# Patient Record
Sex: FEMALE | Race: WHITE | Employment: FULL TIME | URBAN - METROPOLITAN AREA
[De-identification: names, ages, dates, MRNs, and addresses within clinical notes are randomized per-mention and may not be internally consistent; named-entity substitution may affect disease eponyms.]

---

## 2023-08-17 ENCOUNTER — HOSPITAL ENCOUNTER (INPATIENT)
Age: 48
LOS: 1 days | Discharge: HOME | End: 2023-08-18
Attending: EMERGENCY MEDICINE | Admitting: SURGERY
Payer: COMMERCIAL

## 2023-08-17 DIAGNOSIS — T30.0 BURN: Primary | ICD-10-CM

## 2023-08-17 DIAGNOSIS — T23.061A: ICD-10-CM

## 2023-08-17 LAB
ABO + RH BLD: NORMAL
ANION GAP SERPL CALCULATED.3IONS-SCNC: 10 MMOL/L (ref 9–17)
ARM BAND NUMBER: NORMAL
BLOOD BANK SAMPLE EXPIRATION: NORMAL
BLOOD BANK SPECIMEN: ABNORMAL
BLOOD GROUP ANTIBODIES SERPL: NEGATIVE
BODY TEMPERATURE: 37
BUN SERPL-MCNC: 11 MG/DL (ref 6–20)
CHLORIDE SERPL-SCNC: 103 MMOL/L (ref 98–107)
CO2 SERPL-SCNC: 23 MMOL/L (ref 20–31)
COHGB MFR BLD: 2.5 % (ref 0–5)
CREAT SERPL-MCNC: 0.8 MG/DL (ref 0.5–0.9)
ERYTHROCYTE [DISTWIDTH] IN BLOOD BY AUTOMATED COUNT: 12.2 % (ref 11.8–14.4)
ETHANOL PERCENT: <0.01 %
ETHANOLAMINE SERPL-MCNC: <10 MG/DL
FIO2 ON VENT: ABNORMAL %
GFR SERPL CREATININE-BSD FRML MDRD: >60 ML/MIN/1.73M2
GLUCOSE SERPL-MCNC: 85 MG/DL (ref 70–99)
HCG SERPL QL: NEGATIVE
HCO3 VENOUS: 24.9 MMOL/L (ref 24–30)
HCT VFR BLD AUTO: 34.2 % (ref 36.3–47.1)
HGB BLD-MCNC: 11.3 G/DL (ref 11.9–15.1)
INR PPP: 1.1
MCH RBC QN AUTO: 31.7 PG (ref 25.2–33.5)
MCHC RBC AUTO-ENTMCNC: 33 G/DL (ref 28.4–34.8)
MCV RBC AUTO: 95.8 FL (ref 82.6–102.9)
NEGATIVE BASE EXCESS, VEN: 1.5 MMOL/L (ref 0–2)
NRBC BLD-RTO: 0 PER 100 WBC
O2 SAT, VEN: 78.7 % (ref 60–85)
PARTIAL THROMBOPLASTIN TIME: 28.3 SEC (ref 23–36.5)
PCO2, VEN: 52.7 MM HG (ref 39–55)
PH VENOUS: 7.3 (ref 7.32–7.42)
PLATELET # BLD AUTO: 230 K/UL (ref 138–453)
PMV BLD AUTO: 9.3 FL (ref 8.1–13.5)
PO2, VEN: 49.7 MM HG (ref 30–50)
POTASSIUM SERPL-SCNC: 3.8 MMOL/L (ref 3.7–5.3)
PROTHROMBIN TIME: 13.5 SEC (ref 11.7–14.9)
RBC # BLD AUTO: 3.57 M/UL (ref 3.95–5.11)
SODIUM SERPL-SCNC: 136 MMOL/L (ref 135–144)
WBC OTHER # BLD: 6 K/UL (ref 3.5–11.3)

## 2023-08-17 PROCEDURE — 96374 THER/PROPH/DIAG INJ IV PUSH: CPT

## 2023-08-17 PROCEDURE — 84520 ASSAY OF UREA NITROGEN: CPT

## 2023-08-17 PROCEDURE — 6370000000 HC RX 637 (ALT 250 FOR IP)

## 2023-08-17 PROCEDURE — 86850 RBC ANTIBODY SCREEN: CPT

## 2023-08-17 PROCEDURE — 90715 TDAP VACCINE 7 YRS/> IM: CPT

## 2023-08-17 PROCEDURE — 90471 IMMUNIZATION ADMIN: CPT

## 2023-08-17 PROCEDURE — 82805 BLOOD GASES W/O2 SATURATION: CPT

## 2023-08-17 PROCEDURE — G0480 DRUG TEST DEF 1-7 CLASSES: HCPCS

## 2023-08-17 PROCEDURE — 2580000003 HC RX 258

## 2023-08-17 PROCEDURE — 84703 CHORIONIC GONADOTROPIN ASSAY: CPT

## 2023-08-17 PROCEDURE — 6360000002 HC RX W HCPCS

## 2023-08-17 PROCEDURE — 85027 COMPLETE CBC AUTOMATED: CPT

## 2023-08-17 PROCEDURE — 82565 ASSAY OF CREATININE: CPT

## 2023-08-17 PROCEDURE — 80051 ELECTROLYTE PANEL: CPT

## 2023-08-17 PROCEDURE — 82947 ASSAY GLUCOSE BLOOD QUANT: CPT

## 2023-08-17 PROCEDURE — 86900 BLOOD TYPING SEROLOGIC ABO: CPT

## 2023-08-17 PROCEDURE — 85610 PROTHROMBIN TIME: CPT

## 2023-08-17 PROCEDURE — 2060000002 HC BURN ICU INTERMEDIATE R&B

## 2023-08-17 PROCEDURE — 85730 THROMBOPLASTIN TIME PARTIAL: CPT

## 2023-08-17 PROCEDURE — 86901 BLOOD TYPING SEROLOGIC RH(D): CPT

## 2023-08-17 PROCEDURE — 99285 EMERGENCY DEPT VISIT HI MDM: CPT

## 2023-08-17 RX ORDER — OXYCODONE HYDROCHLORIDE 5 MG/1
5 TABLET ORAL EVERY 4 HOURS PRN
Status: DISCONTINUED | OUTPATIENT
Start: 2023-08-17 | End: 2023-08-18 | Stop reason: HOSPADM

## 2023-08-17 RX ORDER — LAMOTRIGINE 100 MG/1
100 TABLET ORAL DAILY
COMMUNITY
Start: 2023-05-12

## 2023-08-17 RX ORDER — CLONAZEPAM 0.5 MG/1
0.5 TABLET ORAL 2 TIMES DAILY PRN
COMMUNITY

## 2023-08-17 RX ORDER — ESTRADIOL AND NORETHINDRONE ACETATE 1; .5 MG/1; MG/1
1 TABLET ORAL DAILY
COMMUNITY

## 2023-08-17 RX ORDER — OXYCODONE HYDROCHLORIDE 5 MG/1
5 TABLET ORAL ONCE
Status: COMPLETED | OUTPATIENT
Start: 2023-08-17 | End: 2023-08-17

## 2023-08-17 RX ORDER — SENNOSIDES A AND B 8.6 MG/1
1 TABLET, FILM COATED ORAL DAILY PRN
Status: DISCONTINUED | OUTPATIENT
Start: 2023-08-17 | End: 2023-08-18 | Stop reason: HOSPADM

## 2023-08-17 RX ORDER — POLYETHYLENE GLYCOL 3350 17 G/17G
17 POWDER, FOR SOLUTION ORAL DAILY
Status: DISCONTINUED | OUTPATIENT
Start: 2023-08-17 | End: 2023-08-18 | Stop reason: HOSPADM

## 2023-08-17 RX ORDER — DULOXETIN HYDROCHLORIDE 60 MG/1
60 CAPSULE, DELAYED RELEASE ORAL 2 TIMES DAILY
COMMUNITY
Start: 2023-03-08

## 2023-08-17 RX ORDER — GINSENG 100 MG
CAPSULE ORAL 3 TIMES DAILY
Status: DISCONTINUED | OUTPATIENT
Start: 2023-08-17 | End: 2023-08-18 | Stop reason: HOSPADM

## 2023-08-17 RX ORDER — SODIUM CHLORIDE 9 MG/ML
INJECTION, SOLUTION INTRAVENOUS PRN
Status: DISCONTINUED | OUTPATIENT
Start: 2023-08-17 | End: 2023-08-18 | Stop reason: HOSPADM

## 2023-08-17 RX ORDER — GABAPENTIN 300 MG/1
600 CAPSULE ORAL EVERY 8 HOURS
Status: DISCONTINUED | OUTPATIENT
Start: 2023-08-17 | End: 2023-08-18 | Stop reason: HOSPADM

## 2023-08-17 RX ORDER — MORPHINE SULFATE 4 MG/ML
4 INJECTION, SOLUTION INTRAMUSCULAR; INTRAVENOUS ONCE
Status: COMPLETED | OUTPATIENT
Start: 2023-08-17 | End: 2023-08-17

## 2023-08-17 RX ORDER — SODIUM CHLORIDE 0.9 % (FLUSH) 0.9 %
5-40 SYRINGE (ML) INJECTION PRN
Status: DISCONTINUED | OUTPATIENT
Start: 2023-08-17 | End: 2023-08-18 | Stop reason: HOSPADM

## 2023-08-17 RX ORDER — OXYBUTYNIN CHLORIDE 10 MG/1
10 TABLET, EXTENDED RELEASE ORAL DAILY
COMMUNITY
Start: 2023-06-26

## 2023-08-17 RX ORDER — METHOCARBAMOL 750 MG/1
750 TABLET, FILM COATED ORAL EVERY 6 HOURS
Status: DISCONTINUED | OUTPATIENT
Start: 2023-08-17 | End: 2023-08-18

## 2023-08-17 RX ORDER — ACETAMINOPHEN 500 MG
1000 TABLET ORAL EVERY 8 HOURS SCHEDULED
Status: DISCONTINUED | OUTPATIENT
Start: 2023-08-17 | End: 2023-08-18 | Stop reason: HOSPADM

## 2023-08-17 RX ORDER — ONDANSETRON 2 MG/ML
4 INJECTION INTRAMUSCULAR; INTRAVENOUS EVERY 6 HOURS PRN
Status: DISCONTINUED | OUTPATIENT
Start: 2023-08-17 | End: 2023-08-18 | Stop reason: HOSPADM

## 2023-08-17 RX ORDER — GABAPENTIN 300 MG/1
300 CAPSULE ORAL EVERY 8 HOURS
Status: DISCONTINUED | OUTPATIENT
Start: 2023-08-17 | End: 2023-08-17

## 2023-08-17 RX ORDER — SODIUM CHLORIDE 0.9 % (FLUSH) 0.9 %
5-40 SYRINGE (ML) INJECTION EVERY 12 HOURS SCHEDULED
Status: DISCONTINUED | OUTPATIENT
Start: 2023-08-17 | End: 2023-08-18 | Stop reason: HOSPADM

## 2023-08-17 RX ORDER — ONDANSETRON 4 MG/1
4 TABLET, ORALLY DISINTEGRATING ORAL EVERY 6 HOURS PRN
Status: DISCONTINUED | OUTPATIENT
Start: 2023-08-17 | End: 2023-08-18 | Stop reason: HOSPADM

## 2023-08-17 RX ADMIN — METHOCARBAMOL 750 MG: 750 TABLET ORAL at 21:37

## 2023-08-17 RX ADMIN — METHOCARBAMOL 750 MG: 750 TABLET ORAL at 11:13

## 2023-08-17 RX ADMIN — OXYCODONE HYDROCHLORIDE 5 MG: 5 TABLET ORAL at 16:05

## 2023-08-17 RX ADMIN — OXYCODONE HYDROCHLORIDE 5 MG: 5 TABLET ORAL at 21:38

## 2023-08-17 RX ADMIN — SODIUM CHLORIDE, PRESERVATIVE FREE 10 ML: 5 INJECTION INTRAVENOUS at 21:38

## 2023-08-17 RX ADMIN — BACITRACIN: 500 OINTMENT TOPICAL at 13:09

## 2023-08-17 RX ADMIN — OXYCODONE HYDROCHLORIDE 5 MG: 5 TABLET ORAL at 13:05

## 2023-08-17 RX ADMIN — GABAPENTIN 300 MG: 300 CAPSULE ORAL at 11:14

## 2023-08-17 RX ADMIN — GABAPENTIN 600 MG: 300 CAPSULE ORAL at 18:35

## 2023-08-17 RX ADMIN — BACITRACIN: 500 OINTMENT TOPICAL at 21:39

## 2023-08-17 RX ADMIN — BACITRACIN: 500 OINTMENT TOPICAL at 16:47

## 2023-08-17 RX ADMIN — MORPHINE SULFATE 4 MG: 4 INJECTION INTRAVENOUS at 06:06

## 2023-08-17 RX ADMIN — SODIUM CHLORIDE, PRESERVATIVE FREE 10 ML: 5 INJECTION INTRAVENOUS at 09:59

## 2023-08-17 RX ADMIN — TETANUS TOXOID, REDUCED DIPHTHERIA TOXOID AND ACELLULAR PERTUSSIS VACCINE, ADSORBED 0.5 ML: 5; 2.5; 8; 8; 2.5 SUSPENSION INTRAMUSCULAR at 06:49

## 2023-08-17 RX ADMIN — ACETAMINOPHEN 1000 MG: 500 TABLET ORAL at 21:38

## 2023-08-17 RX ADMIN — ACETAMINOPHEN 1000 MG: 500 TABLET ORAL at 15:30

## 2023-08-17 RX ADMIN — METHOCARBAMOL 750 MG: 750 TABLET ORAL at 16:05

## 2023-08-17 RX ADMIN — OXYCODONE HYDROCHLORIDE 5 MG: 5 TABLET ORAL at 08:19

## 2023-08-17 ASSESSMENT — PAIN DESCRIPTION - ONSET: ONSET: ON-GOING

## 2023-08-17 ASSESSMENT — PAIN DESCRIPTION - PAIN TYPE: TYPE: ACUTE PAIN

## 2023-08-17 ASSESSMENT — PAIN - FUNCTIONAL ASSESSMENT
PAIN_FUNCTIONAL_ASSESSMENT: 0-10
PAIN_FUNCTIONAL_ASSESSMENT: PREVENTS OR INTERFERES SOME ACTIVE ACTIVITIES AND ADLS
PAIN_FUNCTIONAL_ASSESSMENT: PREVENTS OR INTERFERES SOME ACTIVE ACTIVITIES AND ADLS

## 2023-08-17 ASSESSMENT — PAIN DESCRIPTION - DIRECTION: RADIATING_TOWARDS: RT WRIST

## 2023-08-17 ASSESSMENT — PAIN SCALES - GENERAL
PAINLEVEL_OUTOF10: 7
PAINLEVEL_OUTOF10: 6
PAINLEVEL_OUTOF10: 5
PAINLEVEL_OUTOF10: 7

## 2023-08-17 ASSESSMENT — ENCOUNTER SYMPTOMS
SHORTNESS OF BREATH: 0
SORE THROAT: 0
EYE REDNESS: 0
COLOR CHANGE: 0
BACK PAIN: 0
ABDOMINAL PAIN: 0
CHEST TIGHTNESS: 0
NAUSEA: 0
PHOTOPHOBIA: 0
VOMITING: 0
COUGH: 0
COLOR CHANGE: 1
EYE PAIN: 0
VOICE CHANGE: 0

## 2023-08-17 ASSESSMENT — PAIN DESCRIPTION - ORIENTATION
ORIENTATION: RIGHT
ORIENTATION: RIGHT

## 2023-08-17 ASSESSMENT — PAIN DESCRIPTION - DESCRIPTORS
DESCRIPTORS: BURNING
DESCRIPTORS: BURNING

## 2023-08-17 ASSESSMENT — PAIN DESCRIPTION - LOCATION
LOCATION: HAND
LOCATION: HAND

## 2023-08-17 ASSESSMENT — PAIN DESCRIPTION - FREQUENCY: FREQUENCY: CONTINUOUS
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
## 2023-08-17 NOTE — ED NOTES
Trauma notified that patient is still having pain after oxycodone, requesting IV pain meds.       Blair Herndon RN  08/17/23 1180
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
## 2023-08-17 NOTE — DISCHARGE INSTRUCTIONS
Home Burn Care Instructions  Wound Care: If you have dressings and open areas:  A) Keep them dry and clean. B) You may take a sponge bath, shower or tub bath daily- using Safeguard or Dial soap. If taking a tub bath, be sure to cleanse the tub with a spray or liquid -    NO POWDER - and rinse well before and after dressing changes. C) Check your wounds daily for signs of infection such as:   * a change in color of fluid draining from the burn (zambrano yellow is normal color)   * more fluid draining from the burn   * a bad smell   * an increase in the temperature of the skin (the skin will feel warmer than usual)   * more redness at the edges of the burned area   * swelling around the area   * a fever of 101 degrees farenheit  If you have any of these signs of infection call the doctor or come to the emergency room. Activity:  A) Maintain normal activity, unless instructed by the doctor or nurse. B) Keep burned arms/legs elevated above the heart until the swelling is gone. C) Your return to school or work will be decided by your doctor. D) If you have been taught exercises, please continue these at home. These are very important to maintain/regain full use if your burned area. Healed Burn Areas:  A) Wash these areas daily with Safeguard or Dial soap and lukewarm water. B) Apply lotion to these areas several times a day, to help skin from drying and breaking open. Types of oil you may use- baby oil, crisco, mineral oil, eucerin, or lotion of you choice. Remember to use a THIN layer of lotion each time you apply. DO NOT use talcum powder to the burned areas. C) Healed skin is very thin and delicate. It may blister or break open easily. Avoid extremely hot or cold temperatures. Do not sunbathe or allow direct sun on your burns. D) If small water blisters form, do not break them, they are protecting your burns. Most burns will have a purplish color for a long time.    It

## 2023-08-17 NOTE — PROGRESS NOTES
to 1D burn unit looking to evaluate patient. RN informed  that patient was still in ED at this time. Dr Ya Moore states he will be back 8/18 to evaluate patients burns.

## 2023-08-18 VITALS
DIASTOLIC BLOOD PRESSURE: 62 MMHG | OXYGEN SATURATION: 98 % | HEART RATE: 66 BPM | SYSTOLIC BLOOD PRESSURE: 110 MMHG | TEMPERATURE: 98.1 F | BODY MASS INDEX: 26.03 KG/M2 | RESPIRATION RATE: 18 BRPM | HEIGHT: 66 IN | WEIGHT: 162 LBS

## 2023-08-18 LAB
ANION GAP SERPL CALCULATED.3IONS-SCNC: 14 MMOL/L (ref 9–17)
BASOPHILS # BLD: 0.03 K/UL (ref 0–0.2)
BASOPHILS NFR BLD: 1 % (ref 0–2)
BUN SERPL-MCNC: 7 MG/DL (ref 6–20)
CA-I BLD-SCNC: 1.08 MMOL/L (ref 1.13–1.33)
CALCIUM SERPL-MCNC: 7.5 MG/DL (ref 8.6–10.4)
CHLORIDE SERPL-SCNC: 103 MMOL/L (ref 98–107)
CO2 SERPL-SCNC: 18 MMOL/L (ref 20–31)
CREAT SERPL-MCNC: 0.8 MG/DL (ref 0.5–0.9)
EOSINOPHIL # BLD: 0.13 K/UL (ref 0–0.44)
EOSINOPHILS RELATIVE PERCENT: 2 % (ref 1–4)
ERYTHROCYTE [DISTWIDTH] IN BLOOD BY AUTOMATED COUNT: 12.3 % (ref 11.8–14.4)
GFR SERPL CREATININE-BSD FRML MDRD: >60 ML/MIN/1.73M2
GLUCOSE SERPL-MCNC: 81 MG/DL (ref 70–99)
HCT VFR BLD AUTO: 40.5 % (ref 36.3–47.1)
HGB BLD-MCNC: 12.5 G/DL (ref 11.9–15.1)
IMM GRANULOCYTES # BLD AUTO: <0.03 K/UL (ref 0–0.3)
IMM GRANULOCYTES NFR BLD: 0 %
LYMPHOCYTES NFR BLD: 1.91 K/UL (ref 1.1–3.7)
LYMPHOCYTES RELATIVE PERCENT: 34 % (ref 24–43)
MAGNESIUM SERPL-MCNC: 2.3 MG/DL (ref 1.6–2.6)
MCH RBC QN AUTO: 31.2 PG (ref 25.2–33.5)
MCHC RBC AUTO-ENTMCNC: 30.9 G/DL (ref 28.4–34.8)
MCV RBC AUTO: 101 FL (ref 82.6–102.9)
MONOCYTES NFR BLD: 0.66 K/UL (ref 0.1–1.2)
MONOCYTES NFR BLD: 12 % (ref 3–12)
NEUTROPHILS NFR BLD: 51 % (ref 36–65)
NEUTS SEG NFR BLD: 2.87 K/UL (ref 1.5–8.1)
NRBC BLD-RTO: 0 PER 100 WBC
PHOSPHATE SERPL-MCNC: 2.9 MG/DL (ref 2.6–4.5)
PLATELET # BLD AUTO: 180 K/UL (ref 138–453)
PMV BLD AUTO: 9.8 FL (ref 8.1–13.5)
POTASSIUM SERPL-SCNC: 3.9 MMOL/L (ref 3.7–5.3)
RBC # BLD AUTO: 4.01 M/UL (ref 3.95–5.11)
SODIUM SERPL-SCNC: 135 MMOL/L (ref 135–144)
WBC OTHER # BLD: 5.6 K/UL (ref 3.5–11.3)

## 2023-08-18 PROCEDURE — 83735 ASSAY OF MAGNESIUM: CPT

## 2023-08-18 PROCEDURE — 97535 SELF CARE MNGMENT TRAINING: CPT

## 2023-08-18 PROCEDURE — 6370000000 HC RX 637 (ALT 250 FOR IP)

## 2023-08-18 PROCEDURE — 97165 OT EVAL LOW COMPLEX 30 MIN: CPT

## 2023-08-18 PROCEDURE — 85025 COMPLETE CBC W/AUTO DIFF WBC: CPT

## 2023-08-18 PROCEDURE — 82330 ASSAY OF CALCIUM: CPT

## 2023-08-18 PROCEDURE — 80048 BASIC METABOLIC PNL TOTAL CA: CPT

## 2023-08-18 PROCEDURE — 6370000000 HC RX 637 (ALT 250 FOR IP): Performed by: NURSE PRACTITIONER

## 2023-08-18 PROCEDURE — 97110 THERAPEUTIC EXERCISES: CPT

## 2023-08-18 PROCEDURE — 84100 ASSAY OF PHOSPHORUS: CPT

## 2023-08-18 PROCEDURE — 2580000003 HC RX 258

## 2023-08-18 PROCEDURE — 36415 COLL VENOUS BLD VENIPUNCTURE: CPT

## 2023-08-18 RX ORDER — OXYCODONE HYDROCHLORIDE AND ACETAMINOPHEN 5; 325 MG/1; MG/1
1 TABLET ORAL EVERY 6 HOURS PRN
Qty: 20 TABLET | Refills: 0 | Status: SHIPPED | OUTPATIENT
Start: 2023-08-18 | End: 2023-08-18 | Stop reason: HOSPADM

## 2023-08-18 RX ORDER — GABAPENTIN 300 MG/1
300 CAPSULE ORAL 3 TIMES DAILY
Qty: 21 CAPSULE | Refills: 0 | Status: SHIPPED | OUTPATIENT
Start: 2023-08-26 | End: 2023-09-02

## 2023-08-18 RX ORDER — GINSENG 100 MG
CAPSULE ORAL
Qty: 1 G | Refills: 3 | Status: SHIPPED | OUTPATIENT
Start: 2023-08-18 | End: 2023-08-28

## 2023-08-18 RX ORDER — CALCIUM CARBONATE-CHOLECALCIFEROL TAB 250 MG-125 UNIT 250-125 MG-UNIT
1 TAB ORAL ONCE
Status: COMPLETED | OUTPATIENT
Start: 2023-08-18 | End: 2023-08-18

## 2023-08-18 RX ORDER — OXYBUTYNIN CHLORIDE 10 MG/1
10 TABLET, EXTENDED RELEASE ORAL DAILY
Status: DISCONTINUED | OUTPATIENT
Start: 2023-08-18 | End: 2023-08-18 | Stop reason: HOSPADM

## 2023-08-18 RX ORDER — GABAPENTIN 300 MG/1
600 CAPSULE ORAL EVERY 8 HOURS
Qty: 42 CAPSULE | Refills: 0 | Status: SHIPPED | OUTPATIENT
Start: 2023-08-18 | End: 2023-08-25

## 2023-08-18 RX ORDER — DULOXETIN HYDROCHLORIDE 30 MG/1
60 CAPSULE, DELAYED RELEASE ORAL 2 TIMES DAILY
Status: DISCONTINUED | OUTPATIENT
Start: 2023-08-18 | End: 2023-08-18 | Stop reason: HOSPADM

## 2023-08-18 RX ORDER — SENNOSIDES A AND B 8.6 MG/1
1 TABLET, FILM COATED ORAL DAILY
Qty: 30 TABLET | Refills: 0 | Status: SHIPPED | OUTPATIENT
Start: 2023-08-18 | End: 2023-09-17

## 2023-08-18 RX ORDER — LAMOTRIGINE 100 MG/1
100 TABLET ORAL DAILY
Status: DISCONTINUED | OUTPATIENT
Start: 2023-08-18 | End: 2023-08-18 | Stop reason: HOSPADM

## 2023-08-18 RX ORDER — OXYCODONE HYDROCHLORIDE 5 MG/1
5 TABLET ORAL EVERY 6 HOURS PRN
Qty: 10 TABLET | Refills: 0 | Status: SHIPPED | OUTPATIENT
Start: 2023-08-18 | End: 2023-08-25

## 2023-08-18 RX ORDER — CLONAZEPAM 0.5 MG/1
0.5 TABLET ORAL 3 TIMES DAILY PRN
Status: DISCONTINUED | OUTPATIENT
Start: 2023-08-18 | End: 2023-08-18 | Stop reason: HOSPADM

## 2023-08-18 RX ADMIN — BACITRACIN: 500 OINTMENT TOPICAL at 10:52

## 2023-08-18 RX ADMIN — CLONAZEPAM 0.5 MG: 0.5 TABLET ORAL at 12:44

## 2023-08-18 RX ADMIN — SODIUM CHLORIDE, PRESERVATIVE FREE 10 ML: 5 INJECTION INTRAVENOUS at 08:13

## 2023-08-18 RX ADMIN — LAMOTRIGINE 100 MG: 100 TABLET ORAL at 10:51

## 2023-08-18 RX ADMIN — ACETAMINOPHEN 1000 MG: 500 TABLET ORAL at 06:14

## 2023-08-18 RX ADMIN — GABAPENTIN 600 MG: 300 CAPSULE ORAL at 01:18

## 2023-08-18 RX ADMIN — GABAPENTIN 600 MG: 300 CAPSULE ORAL at 10:20

## 2023-08-18 RX ADMIN — DULOXETINE HYDROCHLORIDE 60 MG: 30 CAPSULE, DELAYED RELEASE ORAL at 10:52

## 2023-08-18 RX ADMIN — OXYBUTYNIN CHLORIDE 10 MG: 10 TABLET, EXTENDED RELEASE ORAL at 10:51

## 2023-08-18 RX ADMIN — CALCIUM CARBONATE-CHOLECALCIFEROL TAB 250 MG-125 UNIT 1 TABLET: 250-125 TAB at 08:12

## 2023-08-18 RX ADMIN — OXYCODONE HYDROCHLORIDE 5 MG: 5 TABLET ORAL at 01:19

## 2023-08-18 RX ADMIN — OXYCODONE HYDROCHLORIDE 5 MG: 5 TABLET ORAL at 06:14

## 2023-08-18 RX ADMIN — OXYCODONE HYDROCHLORIDE 5 MG: 5 TABLET ORAL at 10:20

## 2023-08-18 ASSESSMENT — PAIN - FUNCTIONAL ASSESSMENT
PAIN_FUNCTIONAL_ASSESSMENT: PREVENTS OR INTERFERES SOME ACTIVE ACTIVITIES AND ADLS

## 2023-08-18 ASSESSMENT — PAIN DESCRIPTION - DESCRIPTORS
DESCRIPTORS: BURNING

## 2023-08-18 ASSESSMENT — PAIN DESCRIPTION - ORIENTATION
ORIENTATION: RIGHT

## 2023-08-18 ASSESSMENT — PAIN SCALES - GENERAL
PAINLEVEL_OUTOF10: 7
PAINLEVEL_OUTOF10: 6
PAINLEVEL_OUTOF10: 7
PAINLEVEL_OUTOF10: 7
PAINLEVEL_OUTOF10: 5
PAINLEVEL_OUTOF10: 6

## 2023-08-18 ASSESSMENT — PAIN DESCRIPTION - LOCATION
LOCATION: HAND

## 2023-08-18 NOTE — PLAN OF CARE
Problem: Discharge Planning  Goal: Discharge to home or other facility with appropriate resources  8/18/2023 0556 by Yuly Wilkinson RN  Outcome: Progressing  8/17/2023 1657 by Marin Landau, RN  Outcome: Progressing     Problem: Pain  Goal: Verbalizes/displays adequate comfort level or baseline comfort level  8/18/2023 0556 by Yuly Wilkinson RN  Outcome: Progressing  8/17/2023 1657 by Marin Landau, RN  Outcome: Progressing     Problem: Skin/Tissue Integrity  Goal: Absence of new skin breakdown  Description: 1. Monitor for areas of redness and/or skin breakdown  2. Assess vascular access sites hourly  3. Every 4-6 hours minimum:  Change oxygen saturation probe site  4. Every 4-6 hours:  If on nasal continuous positive airway pressure, respiratory therapy assess nares and determine need for appliance change or resting period.   Outcome: Progressing     Problem: Safety - Adult  Goal: Free from fall injury  Outcome: Progressing

## 2023-08-18 NOTE — PROGRESS NOTES
CLINICAL PHARMACY NOTE: MEDS TO BEDS    Total # of Prescriptions Filled: 4   The following medications were delivered to the patient:  Oxycodone 5mg tabs  Bacitracin 500unit/gm oint  Senna 8.6 tabs  Gabapentin 300mg caps    Additional Documentation:  Delivered to pt in room 162 on 8/18 at 12P.  Copay was $2.61 paid with [employer]
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
## 2023-08-18 NOTE — PROGRESS NOTES
[employer] FROI form completed and faxed to [employer] at [phone]. A copy of the form has been placed in the patient's chart and  aware.
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
## 2023-08-29 ENCOUNTER — OFFICE VISIT (OUTPATIENT)
Dept: BURN CARE | Age: 48
End: 2023-08-29
Payer: COMMERCIAL

## 2023-08-29 VITALS
SYSTOLIC BLOOD PRESSURE: 124 MMHG | WEIGHT: 162 LBS | BODY MASS INDEX: 26.03 KG/M2 | HEIGHT: 66 IN | DIASTOLIC BLOOD PRESSURE: 76 MMHG | HEART RATE: 65 BPM

## 2023-08-29 DIAGNOSIS — T30.0 BURN: Primary | ICD-10-CM

## 2023-08-29 PROCEDURE — 99213 OFFICE O/P EST LOW 20 MIN: CPT | Performed by: PLASTIC SURGERY

## 2023-08-29 PROCEDURE — 99213 OFFICE O/P EST LOW 20 MIN: CPT

## 2023-08-29 RX ORDER — HYDROCODONE BITARTRATE AND ACETAMINOPHEN 5; 325 MG/1; MG/1
1 TABLET ORAL EVERY 8 HOURS PRN
Qty: 12 TABLET | Refills: 0 | Status: SHIPPED | OUTPATIENT
Start: 2023-08-29 | End: 2023-09-02

## 2023-08-29 ASSESSMENT — ENCOUNTER SYMPTOMS
ROS SKIN COMMENTS: BURN TO RIGHT HAND
RESPIRATORY NEGATIVE: 1

## 2023-08-29 NOTE — PROGRESS NOTES
Burn/HandClinic New Patient Visit      CHIEF COMPLAINT:    Chief Complaint   Patient presents with    New Patient     [facility]       HISTORY OF PRESENT ILLNESS:      The patient is a 53 y.o. 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 She is a non-smoker. She has been using bacitracin on the burns. They are healing appropriately with no signs of infection. She is working on flexion and extension of the right index finger. Past Medical History:    No past medical history on file. Past SurgicalHistory:    No past surgical history on file. Current Medications:   Current Outpatient Medications   Medication Sig Dispense Refill    HYDROcodone-acetaminophen (NORCO) 5-325 MG per tablet Take 1 tablet by mouth every 8 hours as needed for Pain for up to 4 days. Intended supply: 5 days. Take lowest dose possible to manage pain Max Daily Amount: 3 tablets 12 tablet 0    gabapentin (NEURONTIN) 300 MG capsule Take 1 capsule by mouth 3 times daily for 7 days. 21 capsule 0    senna (SENOKOT) 8.6 MG tablet Take 1 tablet by mouth daily 30 tablet 0    clonazePAM (KLONOPIN) 0.5 MG tablet Take 1 tablet by mouth 2 times daily as needed. DULoxetine (CYMBALTA) 60 MG extended release capsule Take 1 capsule by mouth 2 times daily      estradiol-norethindrone (ACTIVELLA) 1-0.5 MG per tablet Take 1 tablet by mouth daily      lamoTRIgine (LAMICTAL) 100 MG tablet Take 1 tablet by mouth daily      Melatonin 5 MG CAPS Take 5-15 mg by mouth nightly as needed      oxybutynin (DITROPAN-XL) 10 MG extended release tablet Take 1 tablet by mouth daily      gabapentin (NEURONTIN) 300 MG capsule Take 2 capsules by mouth in the morning and 2 capsules at noon and 2 capsules in the evening. Do all this for 7 days. 42 capsule 0     No current facility-administered medications for this visit.        Allergies:    Lithium and Penicillins    History:   Social History

## 2024-01-11 ENCOUNTER — HOSPITAL ENCOUNTER (INPATIENT)
Age: 49
LOS: 2 days | Discharge: HOME | DRG: 247 | End: 2024-01-13
Attending: EMERGENCY MEDICINE | Admitting: INTERNAL MEDICINE
Payer: MEDICAID

## 2024-01-11 ENCOUNTER — APPOINTMENT (OUTPATIENT)
Dept: CT IMAGING | Age: 49
DRG: 247 | End: 2024-01-11
Payer: MEDICAID

## 2024-01-11 ENCOUNTER — APPOINTMENT (OUTPATIENT)
Dept: GENERAL RADIOLOGY | Age: 49
DRG: 247 | End: 2024-01-11
Payer: MEDICAID

## 2024-01-11 DIAGNOSIS — K56.609 SMALL BOWEL OBSTRUCTION (HCC): Primary | ICD-10-CM

## 2024-01-11 PROBLEM — D86.9 SARCOIDOSIS: Status: ACTIVE | Noted: 2024-01-11

## 2024-01-11 PROBLEM — K56.600 PARTIAL SMALL BOWEL OBSTRUCTION (HCC): Status: ACTIVE | Noted: 2024-01-11

## 2024-01-11 PROBLEM — F41.9 ANXIETY: Status: ACTIVE | Noted: 2024-01-11

## 2024-01-11 PROBLEM — E03.9 HYPOTHYROIDISM: Status: ACTIVE | Noted: 2024-01-11

## 2024-01-11 LAB
ALBUMIN SERPL-MCNC: 4.6 G/DL (ref 3.5–5.2)
ALP SERPL-CCNC: 102 U/L (ref 35–104)
ALT SERPL-CCNC: 20 U/L (ref 5–33)
ANION GAP SERPL CALCULATED.3IONS-SCNC: 12 MMOL/L (ref 9–17)
AST SERPL-CCNC: 19 U/L
BACTERIA URNS QL MICRO: ABNORMAL
BASOPHILS # BLD: 0.03 K/UL (ref 0–0.2)
BASOPHILS NFR BLD: 1 % (ref 0–2)
BILIRUB SERPL-MCNC: 0.5 MG/DL (ref 0.3–1.2)
BILIRUB UR QL STRIP: NEGATIVE
BUN SERPL-MCNC: 9 MG/DL (ref 6–20)
BUN/CREAT SERPL: 9 (ref 9–20)
CALCIUM SERPL-MCNC: 9.3 MG/DL (ref 8.6–10.4)
CHLORIDE SERPL-SCNC: 105 MMOL/L (ref 98–107)
CLARITY UR: ABNORMAL
CO2 SERPL-SCNC: 23 MMOL/L (ref 20–31)
COLOR UR: YELLOW
CREAT SERPL-MCNC: 1 MG/DL (ref 0.5–0.9)
EOSINOPHIL # BLD: <0.03 K/UL (ref 0–0.44)
EOSINOPHILS RELATIVE PERCENT: 0 % (ref 1–4)
EPI CELLS #/AREA URNS HPF: ABNORMAL /HPF (ref 0–5)
ERYTHROCYTE [DISTWIDTH] IN BLOOD BY AUTOMATED COUNT: 12.8 % (ref 11.8–14.4)
GFR SERPL CREATININE-BSD FRML MDRD: >60 ML/MIN/1.73M2
GLUCOSE BLD-MCNC: 87 MG/DL (ref 65–105)
GLUCOSE SERPL-MCNC: 93 MG/DL (ref 70–99)
GLUCOSE UR STRIP-MCNC: NEGATIVE MG/DL
HCG UR QL: NEGATIVE
HCT VFR BLD AUTO: 42.3 % (ref 36.3–47.1)
HGB BLD-MCNC: 14.3 G/DL (ref 11.9–15.1)
HGB UR QL STRIP.AUTO: NEGATIVE
IMM GRANULOCYTES # BLD AUTO: 0.01 K/UL (ref 0–0.3)
IMM GRANULOCYTES NFR BLD: 0 %
KETONES UR STRIP-MCNC: NEGATIVE MG/DL
LACTATE BLDV-SCNC: 0.9 MMOL/L (ref 0.5–1.9)
LEUKOCYTE ESTERASE UR QL STRIP: NEGATIVE
LIPASE SERPL-CCNC: 17 U/L (ref 13–60)
LYMPHOCYTES NFR BLD: 1.48 K/UL (ref 1.1–3.7)
LYMPHOCYTES RELATIVE PERCENT: 23 % (ref 24–43)
MAGNESIUM SERPL-MCNC: 2 MG/DL (ref 1.6–2.6)
MCH RBC QN AUTO: 30.9 PG (ref 25.2–33.5)
MCHC RBC AUTO-ENTMCNC: 33.8 G/DL (ref 28.4–34.8)
MCV RBC AUTO: 91.4 FL (ref 82.6–102.9)
MONOCYTES NFR BLD: 0.31 K/UL (ref 0.1–1.2)
MONOCYTES NFR BLD: 5 % (ref 3–12)
MUCOUS THREADS URNS QL MICRO: ABNORMAL
NEUTROPHILS NFR BLD: 71 % (ref 36–65)
NEUTS SEG NFR BLD: 4.75 K/UL (ref 1.5–8.1)
NITRITE UR QL STRIP: NEGATIVE
NRBC BLD-RTO: 0 PER 100 WBC
PH UR STRIP: 5 [PH] (ref 5–8)
PLATELET # BLD AUTO: 272 K/UL (ref 138–453)
PMV BLD AUTO: 9.3 FL (ref 8.1–13.5)
POTASSIUM SERPL-SCNC: 4.2 MMOL/L (ref 3.7–5.3)
PROT SERPL-MCNC: 8 G/DL (ref 6.4–8.3)
PROT UR STRIP-MCNC: NEGATIVE MG/DL
RBC # BLD AUTO: 4.63 M/UL (ref 3.95–5.11)
RBC #/AREA URNS HPF: ABNORMAL /HPF (ref 0–2)
SODIUM SERPL-SCNC: 140 MMOL/L (ref 135–144)
SP GR UR STRIP: 1.03 (ref 1–1.03)
TROPONIN I SERPL HS-MCNC: <6 NG/L (ref 0–14)
UROBILINOGEN UR STRIP-ACNC: NORMAL EU/DL (ref 0–1)
WBC #/AREA URNS HPF: ABNORMAL /HPF (ref 0–5)
WBC OTHER # BLD: 6.6 K/UL (ref 3.5–11.3)

## 2024-01-11 PROCEDURE — 93005 ELECTROCARDIOGRAM TRACING: CPT | Performed by: EMERGENCY MEDICINE

## 2024-01-11 PROCEDURE — 84484 ASSAY OF TROPONIN QUANT: CPT

## 2024-01-11 PROCEDURE — 85025 COMPLETE CBC W/AUTO DIFF WBC: CPT

## 2024-01-11 PROCEDURE — A4216 STERILE WATER/SALINE, 10 ML: HCPCS | Performed by: EMERGENCY MEDICINE

## 2024-01-11 PROCEDURE — 82947 ASSAY GLUCOSE BLOOD QUANT: CPT

## 2024-01-11 PROCEDURE — 99285 EMERGENCY DEPT VISIT HI MDM: CPT

## 2024-01-11 PROCEDURE — 6370000000 HC RX 637 (ALT 250 FOR IP): Performed by: INTERNAL MEDICINE

## 2024-01-11 PROCEDURE — 6360000002 HC RX W HCPCS: Performed by: EMERGENCY MEDICINE

## 2024-01-11 PROCEDURE — 96376 TX/PRO/DX INJ SAME DRUG ADON: CPT

## 2024-01-11 PROCEDURE — 2580000003 HC RX 258: Performed by: EMERGENCY MEDICINE

## 2024-01-11 PROCEDURE — 71045 X-RAY EXAM CHEST 1 VIEW: CPT

## 2024-01-11 PROCEDURE — 81025 URINE PREGNANCY TEST: CPT

## 2024-01-11 PROCEDURE — 96375 TX/PRO/DX INJ NEW DRUG ADDON: CPT

## 2024-01-11 PROCEDURE — G0378 HOSPITAL OBSERVATION PER HR: HCPCS

## 2024-01-11 PROCEDURE — 74177 CT ABD & PELVIS W/CONTRAST: CPT

## 2024-01-11 PROCEDURE — 2500000003 HC RX 250 WO HCPCS: Performed by: EMERGENCY MEDICINE

## 2024-01-11 PROCEDURE — 80053 COMPREHEN METABOLIC PANEL: CPT

## 2024-01-11 PROCEDURE — 83690 ASSAY OF LIPASE: CPT

## 2024-01-11 PROCEDURE — 99222 1ST HOSP IP/OBS MODERATE 55: CPT | Performed by: INTERNAL MEDICINE

## 2024-01-11 PROCEDURE — 6360000004 HC RX CONTRAST MEDICATION: Performed by: EMERGENCY MEDICINE

## 2024-01-11 PROCEDURE — 96372 THER/PROPH/DIAG INJ SC/IM: CPT

## 2024-01-11 PROCEDURE — 96374 THER/PROPH/DIAG INJ IV PUSH: CPT

## 2024-01-11 PROCEDURE — 83735 ASSAY OF MAGNESIUM: CPT

## 2024-01-11 PROCEDURE — 2580000003 HC RX 258: Performed by: INTERNAL MEDICINE

## 2024-01-11 PROCEDURE — 83605 ASSAY OF LACTIC ACID: CPT

## 2024-01-11 PROCEDURE — 6360000002 HC RX W HCPCS: Performed by: INTERNAL MEDICINE

## 2024-01-11 PROCEDURE — 81001 URINALYSIS AUTO W/SCOPE: CPT

## 2024-01-11 PROCEDURE — 1200000000 HC SEMI PRIVATE

## 2024-01-11 RX ORDER — DULOXETIN HYDROCHLORIDE 60 MG/1
60 CAPSULE, DELAYED RELEASE ORAL 2 TIMES DAILY
Status: DISCONTINUED | OUTPATIENT
Start: 2024-01-11 | End: 2024-01-11

## 2024-01-11 RX ORDER — SODIUM CHLORIDE 9 MG/ML
INJECTION, SOLUTION INTRAVENOUS CONTINUOUS
Status: DISCONTINUED | OUTPATIENT
Start: 2024-01-11 | End: 2024-01-13

## 2024-01-11 RX ORDER — KETOROLAC TROMETHAMINE 30 MG/ML
30 INJECTION, SOLUTION INTRAMUSCULAR; INTRAVENOUS ONCE
Status: COMPLETED | OUTPATIENT
Start: 2024-01-11 | End: 2024-01-11

## 2024-01-11 RX ORDER — 0.9 % SODIUM CHLORIDE 0.9 %
80 INTRAVENOUS SOLUTION INTRAVENOUS ONCE
Status: DISCONTINUED | OUTPATIENT
Start: 2024-01-11 | End: 2024-01-13 | Stop reason: HOSPADM

## 2024-01-11 RX ORDER — SODIUM CHLORIDE 0.9 % (FLUSH) 0.9 %
5-40 SYRINGE (ML) INJECTION EVERY 12 HOURS SCHEDULED
Status: DISCONTINUED | OUTPATIENT
Start: 2024-01-11 | End: 2024-01-13 | Stop reason: HOSPADM

## 2024-01-11 RX ORDER — SODIUM CHLORIDE 9 MG/ML
INJECTION, SOLUTION INTRAVENOUS PRN
Status: DISCONTINUED | OUTPATIENT
Start: 2024-01-11 | End: 2024-01-13 | Stop reason: HOSPADM

## 2024-01-11 RX ORDER — OXYBUTYNIN CHLORIDE 10 MG/1
10 TABLET, EXTENDED RELEASE ORAL DAILY
Status: DISCONTINUED | OUTPATIENT
Start: 2024-01-11 | End: 2024-01-13 | Stop reason: HOSPADM

## 2024-01-11 RX ORDER — LAMOTRIGINE 100 MG/1
100 TABLET ORAL DAILY
Status: DISCONTINUED | OUTPATIENT
Start: 2024-01-11 | End: 2024-01-13 | Stop reason: HOSPADM

## 2024-01-11 RX ORDER — POTASSIUM CHLORIDE 7.45 MG/ML
10 INJECTION INTRAVENOUS PRN
Status: DISCONTINUED | OUTPATIENT
Start: 2024-01-11 | End: 2024-01-13 | Stop reason: HOSPADM

## 2024-01-11 RX ORDER — POTASSIUM CHLORIDE 20 MEQ/1
40 TABLET, EXTENDED RELEASE ORAL PRN
Status: DISCONTINUED | OUTPATIENT
Start: 2024-01-11 | End: 2024-01-13 | Stop reason: HOSPADM

## 2024-01-11 RX ORDER — SODIUM CHLORIDE 0.9 % (FLUSH) 0.9 %
10 SYRINGE (ML) INJECTION PRN
Status: DISCONTINUED | OUTPATIENT
Start: 2024-01-11 | End: 2024-01-13 | Stop reason: HOSPADM

## 2024-01-11 RX ORDER — ENOXAPARIN SODIUM 100 MG/ML
40 INJECTION SUBCUTANEOUS DAILY
Status: DISCONTINUED | OUTPATIENT
Start: 2024-01-11 | End: 2024-01-13 | Stop reason: HOSPADM

## 2024-01-11 RX ORDER — MORPHINE SULFATE 2 MG/ML
2 INJECTION, SOLUTION INTRAMUSCULAR; INTRAVENOUS
Status: DISCONTINUED | OUTPATIENT
Start: 2024-01-11 | End: 2024-01-11

## 2024-01-11 RX ORDER — MORPHINE SULFATE 4 MG/ML
4 INJECTION, SOLUTION INTRAMUSCULAR; INTRAVENOUS
Status: DISCONTINUED | OUTPATIENT
Start: 2024-01-11 | End: 2024-01-13

## 2024-01-11 RX ORDER — POLYETHYLENE GLYCOL 3350 17 G/17G
17 POWDER, FOR SOLUTION ORAL DAILY PRN
Status: DISCONTINUED | OUTPATIENT
Start: 2024-01-11 | End: 2024-01-13 | Stop reason: HOSPADM

## 2024-01-11 RX ORDER — MORPHINE SULFATE 4 MG/ML
4 INJECTION, SOLUTION INTRAMUSCULAR; INTRAVENOUS ONCE
Status: COMPLETED | OUTPATIENT
Start: 2024-01-11 | End: 2024-01-11

## 2024-01-11 RX ORDER — LANOLIN ALCOHOL/MO/W.PET/CERES
6 CREAM (GRAM) TOPICAL NIGHTLY PRN
Status: DISCONTINUED | OUTPATIENT
Start: 2024-01-11 | End: 2024-01-13 | Stop reason: HOSPADM

## 2024-01-11 RX ORDER — VENLAFAXINE HYDROCHLORIDE 75 MG/1
75 CAPSULE, EXTENDED RELEASE ORAL 2 TIMES DAILY
Status: DISCONTINUED | OUTPATIENT
Start: 2024-01-11 | End: 2024-01-13 | Stop reason: HOSPADM

## 2024-01-11 RX ORDER — ACETAMINOPHEN 325 MG/1
650 TABLET ORAL EVERY 6 HOURS PRN
Status: DISCONTINUED | OUTPATIENT
Start: 2024-01-11 | End: 2024-01-13 | Stop reason: HOSPADM

## 2024-01-11 RX ORDER — 0.9 % SODIUM CHLORIDE 0.9 %
500 INTRAVENOUS SOLUTION INTRAVENOUS ONCE
Status: COMPLETED | OUTPATIENT
Start: 2024-01-11 | End: 2024-01-11

## 2024-01-11 RX ORDER — CLONAZEPAM 0.5 MG/1
0.5 TABLET ORAL 2 TIMES DAILY PRN
Status: DISCONTINUED | OUTPATIENT
Start: 2024-01-11 | End: 2024-01-13 | Stop reason: HOSPADM

## 2024-01-11 RX ORDER — ONDANSETRON 4 MG/1
4 TABLET, ORALLY DISINTEGRATING ORAL EVERY 8 HOURS PRN
Status: DISCONTINUED | OUTPATIENT
Start: 2024-01-11 | End: 2024-01-13 | Stop reason: HOSPADM

## 2024-01-11 RX ORDER — ACETAMINOPHEN 650 MG/1
650 SUPPOSITORY RECTAL EVERY 6 HOURS PRN
Status: DISCONTINUED | OUTPATIENT
Start: 2024-01-11 | End: 2024-01-13 | Stop reason: HOSPADM

## 2024-01-11 RX ORDER — MAGNESIUM SULFATE 1 G/100ML
1000 INJECTION INTRAVENOUS PRN
Status: DISCONTINUED | OUTPATIENT
Start: 2024-01-11 | End: 2024-01-13 | Stop reason: HOSPADM

## 2024-01-11 RX ORDER — ONDANSETRON 2 MG/ML
4 INJECTION INTRAMUSCULAR; INTRAVENOUS EVERY 6 HOURS PRN
Status: DISCONTINUED | OUTPATIENT
Start: 2024-01-11 | End: 2024-01-13 | Stop reason: HOSPADM

## 2024-01-11 RX ORDER — VENLAFAXINE 75 MG/1
75 TABLET ORAL 2 TIMES DAILY
COMMUNITY

## 2024-01-11 RX ORDER — ESTRADIOL AND NORETHINDRONE ACETATE 1; .5 MG/1; MG/1
1 TABLET ORAL DAILY
Status: DISCONTINUED | OUTPATIENT
Start: 2024-01-11 | End: 2024-01-13 | Stop reason: HOSPADM

## 2024-01-11 RX ORDER — ONDANSETRON 2 MG/ML
4 INJECTION INTRAMUSCULAR; INTRAVENOUS ONCE
Status: COMPLETED | OUTPATIENT
Start: 2024-01-11 | End: 2024-01-11

## 2024-01-11 RX ADMIN — MORPHINE SULFATE 4 MG: 4 INJECTION, SOLUTION INTRAMUSCULAR; INTRAVENOUS at 19:37

## 2024-01-11 RX ADMIN — SODIUM CHLORIDE, PRESERVATIVE FREE 10 ML: 5 INJECTION INTRAVENOUS at 19:40

## 2024-01-11 RX ADMIN — Medication 6 MG: at 21:16

## 2024-01-11 RX ADMIN — OXYBUTYNIN CHLORIDE 10 MG: 10 TABLET, EXTENDED RELEASE ORAL at 19:35

## 2024-01-11 RX ADMIN — KETOROLAC TROMETHAMINE 30 MG: 30 INJECTION INTRAMUSCULAR; INTRAVENOUS at 11:52

## 2024-01-11 RX ADMIN — SODIUM CHLORIDE: 9 INJECTION, SOLUTION INTRAVENOUS at 19:52

## 2024-01-11 RX ADMIN — Medication 100 ML: at 14:48

## 2024-01-11 RX ADMIN — SODIUM CHLORIDE, PRESERVATIVE FREE 10 ML: 5 INJECTION INTRAVENOUS at 14:48

## 2024-01-11 RX ADMIN — ONDANSETRON 4 MG: 2 INJECTION INTRAMUSCULAR; INTRAVENOUS at 11:52

## 2024-01-11 RX ADMIN — IOPAMIDOL 75 ML: 755 INJECTION, SOLUTION INTRAVENOUS at 14:48

## 2024-01-11 RX ADMIN — SODIUM CHLORIDE 500 ML: 9 INJECTION, SOLUTION INTRAVENOUS at 11:51

## 2024-01-11 RX ADMIN — MORPHINE SULFATE 4 MG: 4 INJECTION, SOLUTION INTRAMUSCULAR; INTRAVENOUS at 12:51

## 2024-01-11 RX ADMIN — LAMOTRIGINE 100 MG: 100 TABLET ORAL at 19:36

## 2024-01-11 RX ADMIN — FAMOTIDINE 20 MG: 10 INJECTION, SOLUTION INTRAVENOUS at 11:53

## 2024-01-11 RX ADMIN — MORPHINE SULFATE 4 MG: 4 INJECTION, SOLUTION INTRAMUSCULAR; INTRAVENOUS at 16:15

## 2024-01-11 RX ADMIN — ENOXAPARIN SODIUM 40 MG: 100 INJECTION SUBCUTANEOUS at 19:35

## 2024-01-11 RX ADMIN — VENLAFAXINE HYDROCHLORIDE 75 MG: 75 CAPSULE, EXTENDED RELEASE ORAL at 21:16

## 2024-01-11 RX ADMIN — MORPHINE SULFATE 4 MG: 4 INJECTION, SOLUTION INTRAMUSCULAR; INTRAVENOUS at 23:41

## 2024-01-11 ASSESSMENT — PAIN DESCRIPTION - ORIENTATION
ORIENTATION: LEFT;MID
ORIENTATION: MID
ORIENTATION: MID;INNER;UPPER
ORIENTATION: LEFT;LOWER

## 2024-01-11 ASSESSMENT — PAIN DESCRIPTION - LOCATION
LOCATION: ABDOMEN
LOCATION: ABDOMEN
LOCATION: ABDOMEN;CHEST
LOCATION: ABDOMEN

## 2024-01-11 ASSESSMENT — ENCOUNTER SYMPTOMS
SHORTNESS OF BREATH: 0
VOMITING: 0
CHEST TIGHTNESS: 0
NAUSEA: 1
ABDOMINAL PAIN: 1
CONSTIPATION: 0

## 2024-01-11 ASSESSMENT — PAIN DESCRIPTION - DESCRIPTORS
DESCRIPTORS: ACHING;SHARP
DESCRIPTORS: ACHING;SHARP
DESCRIPTORS: ACHING
DESCRIPTORS: ACHING

## 2024-01-11 ASSESSMENT — PAIN SCALES - GENERAL
PAINLEVEL_OUTOF10: 6
PAINLEVEL_OUTOF10: 9
PAINLEVEL_OUTOF10: 8
PAINLEVEL_OUTOF10: 6
PAINLEVEL_OUTOF10: 8
PAINLEVEL_OUTOF10: 8
PAINLEVEL_OUTOF10: 9
PAINLEVEL_OUTOF10: 8

## 2024-01-11 ASSESSMENT — PAIN DESCRIPTION - FREQUENCY
FREQUENCY: CONTINUOUS
FREQUENCY: CONTINUOUS

## 2024-01-11 ASSESSMENT — PAIN - FUNCTIONAL ASSESSMENT
PAIN_FUNCTIONAL_ASSESSMENT: PREVENTS OR INTERFERES SOME ACTIVE ACTIVITIES AND ADLS
PAIN_FUNCTIONAL_ASSESSMENT: ACTIVITIES ARE NOT PREVENTED
PAIN_FUNCTIONAL_ASSESSMENT: ACTIVITIES ARE NOT PREVENTED

## 2024-01-11 ASSESSMENT — PAIN DESCRIPTION - ONSET
ONSET: ON-GOING
ONSET: ON-GOING

## 2024-01-11 NOTE — ED NOTES
ED to inpatient nurses report     Chief Complaint   Patient presents with    Nausea     Pt states she has been having abd pain this AM, hx of blockage back in June.     Abdominal Pain      Present to ED from  home  LOC: alert and orientated to name, place, date  Vital signs   Vitals:    01/11/24 1052   BP: (!) 143/106   Pulse: 76   Resp: 18   Temp: 98.1 °F (36.7 °C)   TempSrc: Oral   SpO2: 97%   Weight: 78.9 kg (174 lb)   Height: 1.676 m (5' 6\")      Oxygen Baseline: none    Current needs required N/A   SEPSIS: N/A  [    LDAs:   Peripheral IV 01/11/24 Distal;Left Forearm (Active)     Mobility: Independent  Fall Risk:  LOW  Pending ED orders: NONE  Present condition: STABLE  Code Status: FULL  Consults: IP CONSULT TO GENERAL SURGERY  IP CONSULT TO HOSPITALIST  [x]  Hospitalist  Completed  [x] yes [] no Who: BLOOD  []  Medicine  Completed  [] yes [] No Who:   []  Cardiology  Completed  [] yes [] No Who:   []  GI   Completed  [] yes [] No Who:   []  Neurology  Completed  [] yes [] No Who:   []  Nephrology Completed  [] yes [] No Who:    []  Vascular  Completed  [] yes [] No Who:   []  Ortho  Completed  [] yes [] No Who:     [x]  Surgery  Completed  [x] yes [] No Who: CANOS   []  Urology  Completed  [] yes [] No Who:    []  CT Surgery Completed  [] yes [] No Who:   []  Podiatry  Completed  [] yes [] No Who:    []  Other    Completed  [] yes [] No Who:  Interventions: PAIN MEDS/500 ML BOLUS/PEPCID/CT  Important Events: PT PRESENTS TO ED WITH C/O BELLY/CHEST PAIN SUDDENLY LAST NIGHT; WOKE PT UP AROUND 3AM. STATES SHE HAS HAD PAIN LIKE THIS PRIOR WITH PLEURISY; STATES PAIN RADIATES THROUGH CHEST TO HER BACK. NAUSEA WITHOUT VOMITING.   DX: POSSIBLE SMALL BOWEL OBSTRUCTION. NO NG ORDERED AT THIS TIME PER SURGERY.        Electronically signed by Glendy Caballero RN on 1/11/2024 at 5:00 PM

## 2024-01-11 NOTE — CONSULTS
General Surgery:  Consult Note        PATIENT NAME: Vani Brown   YOB: 1975    ADMISSION DATE: 1/11/2024 11:00 AM     Admitting Provider: [unfilled]    Consulted Physician: Dr. Gutierrez     TODAY'S DATE: 1/11/2024    Chief Complaint:  Abdominal pain  Consult Regarding:  SBO    HISTORY OF PRESENT ILLNESS:  The patient is a 48 y.o. female  who presented to the hospital due to abdominal pain.  She was standing for a job interview and noticed abdominal pain.  Patient has medical history of ulcerative colitis for which she underwent subtotal colectomy many years ago back in [place].  Patient did more recently have a exploratory abdominal procedure done about 6 months ago through midline incision however she did not have any bowel resected at that time.  Patient normally has multiple bowel movements a day however states her last bowel movement was yesterday and has not passed gas today.  Patient denies nausea or emesis at this time.  Workup in the emergency department showed no leukocytosis.  CT scan was obtained which showed dilated small bowel with concern for possible small bowel obstruction.  General surgery was consulted for this.      Past Medical History:    No past medical history on file.    Past Surgical History:    No past surgical history on file.    Medications Prior to Admission:   Not in a hospital admission.    Allergies:  Lithium and Penicillins    Social History:   Social History     Socioeconomic History    Marital status: Single     Spouse name: Not on file    Number of children: Not on file    Years of education: Not on file    Highest education level: Not on file   Occupational History    Not on file   Tobacco Use    Smoking status: Never    Smokeless tobacco: Never   Substance and Sexual Activity    Alcohol use: Not on file    Drug use: Not on file    Sexual activity: Not on file   Other Topics Concern    Not on file   Social History Narrative    Not on file     Social  structures are without acute process.  Gas-filled left subdiaphragmatic splenic flexure.     No acute process.         ASSESSMENT:  There are no active hospital problems to display for this patient.      48-year-old female with history of ulcerative colitis and subsequent subtotal colectomy presents with abdominal pain and CT finding of dilated small bowel concerning for partial small bowel obstruction    Plan:  Would appreciate medical admission  Recommend n.p.o.  IVF while n.p.o.  Patient not nauseous at this time so we will continue to monitor without NG tube, if patient becomes nauseous and has episodes of emesis may require NG tube for decompression  At this time we will trial nonoperative management with bowel rest.  Discussed with patient that if exam, labs, or vitals were to change may need operative intervention  Will follow    Discussed with Dr. Gutierrez      Electronically signed by Miguelito Shields DO  on 1/11/2024 at 4:00 PM     Attending Physician Statement  I have discussed the case with Dr Shields, including pertinent history and exam findings with the resident. I have seen and examined the patient and the key elements of the encounter have been performed by me.  I agree with the assessment, plan and orders as documented by the resident.      Electronically signed by Herminio Gutierrez IV, DO  on 1/12/2024 at 6:16 PM

## 2024-01-11 NOTE — ED PROVIDER NOTES
[facility] MED SURG  Emergency Department Encounter  Emergency Medicine Physician Note     Pt Name:Vani Brown  MRN: [id number]  Birthdate 1975  Date of evaluation: 1/11/24  PCP:  Nita Rosario  Note Started: 11:01 AM EST      CHIEF COMPLAINT       Chief Complaint   Patient presents with    Nausea     Pt states she has been having abd pain this AM, hx of blockage back in June.     Abdominal Pain       HISTORY OF PRESENT ILLNESS  (Location/Symptom, Timing/Onset, Context/Setting, Quality, Duration, Modifying Factors, Severity.)      Vani Brown is a 48 y.o. female who presents with abdominal pain and nausea.  Patient states abdominal pain started today.  Patient has a history of frequent episodes of abdominal pain.  Patient was seen back on December 30 for abdominal pain at outside facility, had CT scan was noted to be normal.  Patient states she had improvement of pain and then it returned.  Patient also describing midsternal chest pain, reproducible with pressure.  Patient denies any lightheadedness dizziness, diaphoresis, swelling in the lower extremities.  Patient denies any change in bowel habits or change in urination.    PAST MEDICAL / SURGICAL / SOCIAL / FAMILY HISTORY      has a past medical history of Anxiety, Arthritis, CAP (community acquired pneumonia), Depression, Dyslipidemia, GERD (gastroesophageal reflux disease), Hypothyroidism, IBS (irritable bowel syndrome), Ileus (HCC), Internal hernia, Pulmonary nodules, RLS (restless legs syndrome), and Sarcoidosis.  No additional pertinent        has a past surgical history that includes Abdominal exploration surgery; Omentectomy; subtotal colectomy; Abdominal adhesion surgery; Appendectomy; Carpal tunnel release (Bilateral); cervical fusion; Skin graft; Endoscopy, colon, diagnostic; and Colonoscopy.  No additional pertinent       Social History     Socioeconomic History    Marital status: Single     Spouse name: Not on file    Number of  URINALYSIS - Abnormal; Notable for the following components:    Bacteria, UA FEW (*)     Mucus, UA 3+ (*)     All other components within normal limits   LIPASE   TROPONIN   MAGNESIUM   PREGNANCY, URINE   LACTATE, SEPSIS   BASIC METABOLIC PANEL W/ REFLEX TO MG FOR LOW K   CBC WITH AUTO DIFFERENTIAL   POC GLUCOSE FINGERSTICK       Vitals Reviewed:    Vitals:    01/11/24 1052 01/11/24 1830 01/11/24 1934 01/11/24 2007   BP: (!) 143/106 133/72 133/76    Pulse: 76 69 75    Resp: 18 17 18 16   Temp: 98.1 °F (36.7 °C) 98.3 °F (36.8 °C) 98.6 °F (37 °C)    TempSrc: Oral Oral Oral    SpO2: 97% 94% 96%    Weight: 78.9 kg (174 lb)      Height: 1.676 m (5' 6\")          MEDICATIONS GIVEN TO PATIENT THIS ENCOUNTER:  Orders Placed This Encounter   Medications    ketorolac (TORADOL) injection 30 mg    ondansetron (ZOFRAN) injection 4 mg    famotidine (PEPCID) 20 mg in sodium chloride (PF) 0.9 % 10 mL injection    sodium chloride 0.9 % bolus 500 mL    morphine sulfate (PF) injection 4 mg    sodium chloride flush 0.9 % injection 10 mL    iopamidol (ISOVUE-370) 76 % injection 75 mL    sodium chloride 0.9 % bolus 80 mL    morphine sulfate (PF) injection 4 mg    clonazePAM (KLONOPIN) tablet 0.5 mg    DISCONTD: DULoxetine (CYMBALTA) extended release capsule 60 mg    estradiol-norethindrone (ACTIVELLA) 1-0.5 MG per tablet 1 tablet    lamoTRIgine (LAMICTAL) tablet 100 mg    melatonin tablet 6 mg    oxyBUTYnin (DITROPAN-XL) extended release tablet 10 mg    sodium chloride flush 0.9 % injection 5-40 mL    sodium chloride flush 0.9 % injection 10 mL    0.9 % sodium chloride infusion    OR Linked Order Group     potassium chloride (KLOR-CON M) extended release tablet 40 mEq     potassium bicarb-citric acid (EFFER-K) effervescent tablet 40 mEq     potassium chloride 10 mEq/100 mL IVPB (Peripheral Line)    magnesium sulfate 1000 mg in dextrose 5% 100 mL IVPB    enoxaparin (LOVENOX) injection 40 mg     Order Specific Question:   Indication of Use

## 2024-01-11 NOTE — H&P
data in the 24 hours ending 01/11/24 3348    General Appearance:  alert, well appearing, and in no acute distress  Mental status: oriented to person, place, and time  Head:  normocephalic, atraumatic  Eye: no icterus, redness, pupils equal and reactive, extraocular eye movements intact, conjunctiva clear  Ear: normal external ear, no discharge, hearing intact  Nose:  no drainage noted  Mouth: mucous membranes moist  Neck: supple, no carotid bruits, thyroid not palpable  Lungs: Bilateral equal air entry, clear to auscultation, no wheezing, rales or rhonchi, normal effort  Cardiovascular: normal rate, regular rhythm, no murmur, gallop, rub.  Abdomen: Soft, nontender, nondistended, no hepatomegaly or splenomegaly  Neurologic: There are no new focal motor or sensory deficits, normal muscle tone and bulk, no abnormal sensation, normal speech, cranial nerves II through XII grossly intact  Skin: No gross lesions, rashes, bruising or bleeding on exposed skin area  Extremities:  peripheral pulses palpable, no pedal edema or calf pain with palpation  Psych: normal affect     Investigations:      Laboratory Testing:  Recent Results (from the past 24 hour(s))   EKG 12 Lead    Collection Time: 01/11/24 10:57 AM   Result Value Ref Range    Ventricular Rate 82 BPM    Atrial Rate 82 BPM    P-R Interval 152 ms    QRS Duration 76 ms    Q-T Interval 394 ms    QTc Calculation (Bazett) 460 ms    P Axis 61 degrees    R Axis 36 degrees    T Axis 66 degrees   CBC with Auto Differential    Collection Time: 01/11/24 11:40 AM   Result Value Ref Range    WBC 6.6 3.5 - 11.3 k/uL    RBC 4.63 3.95 - 5.11 m/uL    Hemoglobin 14.3 11.9 - 15.1 g/dL    Hematocrit 42.3 36.3 - 47.1 %    MCV 91.4 82.6 - 102.9 fL    MCH 30.9 25.2 - 33.5 pg    MCHC 33.8 28.4 - 34.8 g/dL    RDW 12.8 11.8 - 14.4 %    Platelets 272 138 - 453 k/uL    MPV 9.3 8.1 - 13.5 fL    NRBC Automated 0.0 0.0 per 100 WBC    Neutrophils % 71 (H) 36 - 65 %    Lymphocytes % 23 (L) 24 - 43 %  PM   Result Value Ref Range    WBC, UA 2 TO 5 0 - 5 /HPF    RBC, UA 2 TO 5 0 - 2 /HPF    Epithelial Cells UA 5 TO 10 0 - 5 /HPF    Bacteria, UA FEW (A) None    Mucus, UA 3+ (A) None   PREGNANCY, URINE    Collection Time: 01/11/24 12:05 PM   Result Value Ref Range    HCG(Urine) Pregnancy Test NEGATIVE NEGATIVE   Lactate, Sepsis    Collection Time: 01/11/24  4:05 PM   Result Value Ref Range    Lactic Acid, Sepsis 0.9 0.5 - 1.9 mmol/L       Imaging/Diagnostics:  CT ABDOMEN PELVIS W IV CONTRAST Additional Contrast? None    Result Date: 1/11/2024  Status post subtotal colectomy.  Distended small bowel involving the ileum in the left abdomen with tethered/ folded appearance concerning for least partial obstructing process related to an adhesion.  Consider follow-up with oral contrast or small-bowel follow-through series.     XR CHEST 1 VIEW    Result Date: 1/11/2024  No acute process.       Assessment :      Hospital Problems             Last Modified POA    * (Principal) Partial small bowel obstruction (HCC) 1/11/2024 Yes    Sarcoidosis 1/11/2024 Yes    Acquired hypothyroidism 1/11/2024 Yes    Anxiety 1/11/2024 Yes       Plan:     Patient status inpatient in the Med/Surge    Gen surg consulted: conservative management at this time  Iv fluids, pain control, anti-emetics  Resume home po meds with sip water  Dvt prophylaxis    Consultations:   IP CONSULT TO GENERAL SURGERY  IP CONSULT TO HOSPITALIST     Patient is admitted as inpatient status because of co-morbidities listed above, severity of signs and symptoms as outlined, requirement for current medical therapies and most importantly because of direct risk to patient if care not provided in a hospital setting.  Expected length of stay > 48 hours.    Dustin Cortez DO  1/11/2024  4:58 PM    Copy sent to Nita Trujillo

## 2024-01-12 LAB
ANION GAP SERPL CALCULATED.3IONS-SCNC: 10 MMOL/L (ref 9–17)
BASOPHILS # BLD: 0.04 K/UL (ref 0–0.2)
BASOPHILS NFR BLD: 1 % (ref 0–2)
BUN SERPL-MCNC: 12 MG/DL (ref 6–20)
BUN/CREAT SERPL: 13 (ref 9–20)
CALCIUM SERPL-MCNC: 8.4 MG/DL (ref 8.6–10.4)
CHLORIDE SERPL-SCNC: 107 MMOL/L (ref 98–107)
CO2 SERPL-SCNC: 23 MMOL/L (ref 20–31)
CREAT SERPL-MCNC: 0.9 MG/DL (ref 0.5–0.9)
EKG ATRIAL RATE: 82 BPM
EKG P AXIS: 61 DEGREES
EKG P-R INTERVAL: 152 MS
EKG Q-T INTERVAL: 394 MS
EKG QRS DURATION: 76 MS
EKG QTC CALCULATION (BAZETT): 460 MS
EKG R AXIS: 36 DEGREES
EKG T AXIS: 66 DEGREES
EKG VENTRICULAR RATE: 82 BPM
EOSINOPHIL # BLD: <0.03 K/UL (ref 0–0.44)
EOSINOPHILS RELATIVE PERCENT: 0 % (ref 1–4)
ERYTHROCYTE [DISTWIDTH] IN BLOOD BY AUTOMATED COUNT: 13 % (ref 11.8–14.4)
GFR SERPL CREATININE-BSD FRML MDRD: >60 ML/MIN/1.73M2
GLUCOSE BLD-MCNC: 68 MG/DL (ref 65–105)
GLUCOSE BLD-MCNC: 77 MG/DL (ref 65–105)
GLUCOSE BLD-MCNC: 78 MG/DL (ref 65–105)
GLUCOSE BLD-MCNC: 85 MG/DL (ref 65–105)
GLUCOSE BLD-MCNC: 85 MG/DL (ref 65–105)
GLUCOSE SERPL-MCNC: 79 MG/DL (ref 70–99)
HCT VFR BLD AUTO: 36.7 % (ref 36.3–47.1)
HGB BLD-MCNC: 12.1 G/DL (ref 11.9–15.1)
IMM GRANULOCYTES # BLD AUTO: 0 K/UL (ref 0–0.3)
IMM GRANULOCYTES NFR BLD: 0 %
LYMPHOCYTES NFR BLD: 1.48 K/UL (ref 1.1–3.7)
LYMPHOCYTES RELATIVE PERCENT: 31 % (ref 24–43)
MCH RBC QN AUTO: 31 PG (ref 25.2–33.5)
MCHC RBC AUTO-ENTMCNC: 33 G/DL (ref 28.4–34.8)
MCV RBC AUTO: 94.1 FL (ref 82.6–102.9)
MONOCYTES NFR BLD: 0.32 K/UL (ref 0.1–1.2)
MONOCYTES NFR BLD: 7 % (ref 3–12)
NEUTROPHILS NFR BLD: 62 % (ref 36–65)
NEUTS SEG NFR BLD: 2.99 K/UL (ref 1.5–8.1)
NRBC BLD-RTO: 0 PER 100 WBC
PLATELET # BLD AUTO: 244 K/UL (ref 138–453)
PMV BLD AUTO: 9.1 FL (ref 8.1–13.5)
POTASSIUM SERPL-SCNC: 4 MMOL/L (ref 3.7–5.3)
RBC # BLD AUTO: 3.9 M/UL (ref 3.95–5.11)
SODIUM SERPL-SCNC: 140 MMOL/L (ref 135–144)
WBC OTHER # BLD: 4.8 K/UL (ref 3.5–11.3)

## 2024-01-12 PROCEDURE — 85025 COMPLETE CBC W/AUTO DIFF WBC: CPT

## 2024-01-12 PROCEDURE — 1200000000 HC SEMI PRIVATE

## 2024-01-12 PROCEDURE — 96372 THER/PROPH/DIAG INJ SC/IM: CPT

## 2024-01-12 PROCEDURE — 36415 COLL VENOUS BLD VENIPUNCTURE: CPT

## 2024-01-12 PROCEDURE — 6370000000 HC RX 637 (ALT 250 FOR IP): Performed by: INTERNAL MEDICINE

## 2024-01-12 PROCEDURE — 82947 ASSAY GLUCOSE BLOOD QUANT: CPT

## 2024-01-12 PROCEDURE — 6360000002 HC RX W HCPCS: Performed by: INTERNAL MEDICINE

## 2024-01-12 PROCEDURE — 99232 SBSQ HOSP IP/OBS MODERATE 35: CPT | Performed by: INTERNAL MEDICINE

## 2024-01-12 PROCEDURE — 96376 TX/PRO/DX INJ SAME DRUG ADON: CPT

## 2024-01-12 PROCEDURE — 2580000003 HC RX 258: Performed by: INTERNAL MEDICINE

## 2024-01-12 PROCEDURE — 93010 ELECTROCARDIOGRAM REPORT: CPT | Performed by: INTERNAL MEDICINE

## 2024-01-12 PROCEDURE — G0378 HOSPITAL OBSERVATION PER HR: HCPCS

## 2024-01-12 PROCEDURE — 80048 BASIC METABOLIC PNL TOTAL CA: CPT

## 2024-01-12 RX ORDER — GABAPENTIN 100 MG/1
100 CAPSULE ORAL 3 TIMES DAILY PRN
COMMUNITY

## 2024-01-12 RX ORDER — ORPHENADRINE CITRATE 100 MG/1
100 TABLET, EXTENDED RELEASE ORAL 2 TIMES DAILY PRN
Status: ON HOLD | COMMUNITY
End: 2024-01-12

## 2024-01-12 RX ORDER — MELOXICAM 7.5 MG/1
7.5 TABLET ORAL DAILY
Status: ON HOLD | COMMUNITY
End: 2024-01-12

## 2024-01-12 RX ORDER — GABAPENTIN 100 MG/1
100 CAPSULE ORAL 3 TIMES DAILY
Status: DISCONTINUED | OUTPATIENT
Start: 2024-01-12 | End: 2024-01-13 | Stop reason: HOSPADM

## 2024-01-12 RX ORDER — OMEPRAZOLE 40 MG/1
40 CAPSULE, DELAYED RELEASE ORAL DAILY
Status: ON HOLD | COMMUNITY
End: 2024-01-12

## 2024-01-12 RX ADMIN — VENLAFAXINE HYDROCHLORIDE 75 MG: 75 CAPSULE, EXTENDED RELEASE ORAL at 21:24

## 2024-01-12 RX ADMIN — MORPHINE SULFATE 4 MG: 4 INJECTION, SOLUTION INTRAMUSCULAR; INTRAVENOUS at 04:42

## 2024-01-12 RX ADMIN — OXYBUTYNIN CHLORIDE 10 MG: 10 TABLET, EXTENDED RELEASE ORAL at 09:36

## 2024-01-12 RX ADMIN — MORPHINE SULFATE 4 MG: 4 INJECTION, SOLUTION INTRAMUSCULAR; INTRAVENOUS at 18:47

## 2024-01-12 RX ADMIN — MORPHINE SULFATE 4 MG: 4 INJECTION, SOLUTION INTRAMUSCULAR; INTRAVENOUS at 14:19

## 2024-01-12 RX ADMIN — SODIUM CHLORIDE: 9 INJECTION, SOLUTION INTRAVENOUS at 04:43

## 2024-01-12 RX ADMIN — SODIUM CHLORIDE, PRESERVATIVE FREE 10 ML: 5 INJECTION INTRAVENOUS at 14:21

## 2024-01-12 RX ADMIN — MORPHINE SULFATE 4 MG: 4 INJECTION, SOLUTION INTRAMUSCULAR; INTRAVENOUS at 09:37

## 2024-01-12 RX ADMIN — SODIUM CHLORIDE, PRESERVATIVE FREE 10 ML: 5 INJECTION INTRAVENOUS at 09:36

## 2024-01-12 RX ADMIN — VENLAFAXINE HYDROCHLORIDE 75 MG: 75 CAPSULE, EXTENDED RELEASE ORAL at 09:36

## 2024-01-12 RX ADMIN — MORPHINE SULFATE 4 MG: 4 INJECTION, SOLUTION INTRAMUSCULAR; INTRAVENOUS at 22:05

## 2024-01-12 RX ADMIN — LAMOTRIGINE 100 MG: 100 TABLET ORAL at 09:36

## 2024-01-12 RX ADMIN — SODIUM CHLORIDE: 9 INJECTION, SOLUTION INTRAVENOUS at 22:04

## 2024-01-12 RX ADMIN — ENOXAPARIN SODIUM 40 MG: 100 INJECTION SUBCUTANEOUS at 09:36

## 2024-01-12 RX ADMIN — CLONAZEPAM 0.5 MG: 0.5 TABLET ORAL at 09:44

## 2024-01-12 RX ADMIN — Medication 6 MG: at 21:24

## 2024-01-12 RX ADMIN — CLONAZEPAM 0.5 MG: 0.5 TABLET ORAL at 21:32

## 2024-01-12 RX ADMIN — SODIUM CHLORIDE: 9 INJECTION, SOLUTION INTRAVENOUS at 13:54

## 2024-01-12 ASSESSMENT — PAIN SCALES - GENERAL
PAINLEVEL_OUTOF10: 7
PAINLEVEL_OUTOF10: 5
PAINLEVEL_OUTOF10: 4
PAINLEVEL_OUTOF10: 9
PAINLEVEL_OUTOF10: 8
PAINLEVEL_OUTOF10: 8
PAINLEVEL_OUTOF10: 5
PAINLEVEL_OUTOF10: 8

## 2024-01-12 ASSESSMENT — PAIN DESCRIPTION - LOCATION
LOCATION: ABDOMEN

## 2024-01-12 ASSESSMENT — PAIN DESCRIPTION - DESCRIPTORS
DESCRIPTORS: ACHING
DESCRIPTORS: ACHING;SHARP
DESCRIPTORS: ACHING;SHARP
DESCRIPTORS: ACHING
DESCRIPTORS: ACHING;SHARP

## 2024-01-12 ASSESSMENT — PAIN DESCRIPTION - PAIN TYPE
TYPE: ACUTE PAIN

## 2024-01-12 ASSESSMENT — PAIN DESCRIPTION - FREQUENCY
FREQUENCY: INTERMITTENT
FREQUENCY: CONTINUOUS
FREQUENCY: INTERMITTENT

## 2024-01-12 ASSESSMENT — PAIN DESCRIPTION - ONSET
ONSET: GRADUAL
ONSET: GRADUAL
ONSET: ON-GOING
ONSET: GRADUAL
ONSET: GRADUAL

## 2024-01-12 ASSESSMENT — PAIN - FUNCTIONAL ASSESSMENT
PAIN_FUNCTIONAL_ASSESSMENT: ACTIVITIES ARE NOT PREVENTED
PAIN_FUNCTIONAL_ASSESSMENT: PREVENTS OR INTERFERES SOME ACTIVE ACTIVITIES AND ADLS

## 2024-01-12 ASSESSMENT — PAIN DESCRIPTION - ORIENTATION
ORIENTATION: LEFT;LOWER
ORIENTATION: LOWER;LEFT
ORIENTATION: LEFT;LOWER
ORIENTATION: LEFT
ORIENTATION: LEFT;LOWER

## 2024-01-12 NOTE — PROGRESS NOTES
[facility]  Office: [phone]  Darius Fair DO, Braden Miranda DO, Ad Elizalde DO, Dustin Cortez DO, Kim Jennings MD, Hien Torres MD, Andre Gee MD, Dianne Littlejohn MD,  Marco Hong MD, Lorenzo Dawkins MD, Adi Charles MD,  Wyatt Enrique DO, Tracey Christine MD, Christian Dewitt MD, Brooks Fair DO, Ginger Tapia MD,  Lambert Menchaca DO, Natalee Artis MD, Terese Magana MD, Randa Giron MD, Gigi Chapman MD,  Chester Spencer MD, Mattie Santos MD, Giovanni Lobato MD, Yael Maciel MD, Arvind Dias MD, Valeriano Ballard MD, Parag Lan DO, Bucky Reid DO, Daphne Joy MD,  Sagar Whittaker MD, Shirley Waterhouse, CNP,  Mine Tobin CNP, Malcolm Santana, CNP,  Елена Almaraz, SAMM, Angelika Dial, CNP, Aretha Hernandez, CNP, Jenny Quintanilla CNP, Bianca London, CNP, Anum Breaux, CNP, Dianne Adams, PA-C, Nuzhat Vaughn, PA-C, Nevaeh Dasilva, CNP, Gail Singleton, CNS, Carline Stacy, CNP, Oanh Yoder, CNP, Tracy Schwab, CNP         [facility]   IN-PATIENT SERVICE   [facility]    Progress Note    1/12/2024    12:37 PM    Name:   Vani Brown  MRN:     [id number]     Acct:      [id number]   Room:   2007/2007-02  IP Day:  1  Admit Date:  1/11/2024 11:00 AM    PCP:   Nita Rosario  Code Status:  Full Code    Subjective:     C/C:   Chief Complaint   Patient presents with    Nausea     Pt states she has been having abd pain this AM, hx of blockage back in June.     Abdominal Pain     Interval History Status: improved.     States the pain med helps and then later asked if she could get an increased dose, that it wears off early    Denies cp/sob/n/v    Tolerating clears    Brief History:     Vani Brown is a 48 y.o. Non- / non  female who presents with Nausea (Pt states she has been having abd pain this AM, hx of blockage back in June. ) and Abdominal Pain   and is admitted to the hospital for the management of Partial small  bowel obstruction (HCC).     This 48 yof presents with abd pain in area of previous surgery that woke her up at 230am.  She has had gas/bloating, loose stools with urgency, a little nausea and recent night sweats.  No vomiting/fevers/chills.  She has had virtually no po intake today.  In the past she has had a subtotal colectomy for unclear reasons, and more recently exploratory laparotomy with omentectomy and adhesiolysis.    Review of Systems:     Constitutional:  negative for chills, fevers, sweats  Respiratory:  negative for cough, dyspnea on exertion, shortness of breath, wheezing  Cardiovascular:  negative for chest pain, chest pressure/discomfort, lower extremity edema, palpitations  Gastrointestinal:  negative for constipation, diarrhea, nausea, vomiting  Neurological:  negative for dizziness, headache    Medications:     Allergies:    Allergies   Allergen Reactions    Lithium Anaphylaxis     Patient also states right sided paralysis    Penicillins Anaphylaxis       Current Meds:   Scheduled Meds:    sodium chloride  80 mL IntraVENous Once    estradiol-norethindrone  1 tablet Oral Daily    lamoTRIgine  100 mg Oral Daily    oxyBUTYnin  10 mg Oral Daily    sodium chloride flush  5-40 mL IntraVENous 2 times per day    enoxaparin  40 mg SubCUTAneous Daily    venlafaxine  75 mg Oral BID     Continuous Infusions:    sodium chloride      sodium chloride 100 mL/hr at 01/12/24 0620     PRN Meds: sodium chloride flush, clonazePAM, melatonin, sodium chloride flush, sodium chloride, potassium chloride **OR** potassium alternative oral replacement **OR** potassium chloride, magnesium sulfate, ondansetron **OR** ondansetron, polyethylene glycol, acetaminophen **OR** acetaminophen, morphine    Data:     Past Medical History:   has a past medical history of Anxiety, Arthritis, CAP (community acquired pneumonia), Depression, Dyslipidemia, GERD (gastroesophageal reflux disease), Hypothyroidism, IBS (irritable bowel syndrome),

## 2024-01-12 NOTE — PLAN OF CARE
Problem: Discharge Planning  Goal: Discharge to home or other facility with appropriate resources  1/11/2024 2311 by Judith Munguia RN  Outcome: Progressing  Flowsheets (Taken 1/11/2024 2030)  Discharge to home or other facility with appropriate resources:   Identify barriers to discharge with patient and caregiver   Arrange for needed discharge resources and transportation as appropriate   Identify discharge learning needs (meds, wound care, etc)   Refer to discharge planning if patient needs post-hospital services based on physician order or complex needs related to functional status, cognitive ability or social support system  1/11/2024 2003 by Milady Ochoa RN  Outcome: Progressing  Flowsheets (Taken 1/11/2024 1855)  Discharge to home or other facility with appropriate resources: Identify discharge learning needs (meds, wound care, etc)     Problem: Pain  Goal: Verbalizes/displays adequate comfort level or baseline comfort level  1/11/2024 2311 by Judith Munguia RN  Outcome: Progressing  1/11/2024 2003 by Milady Ochoa RN  Outcome: Adequate for Discharge     Problem: Gastrointestinal - Adult  Goal: Minimal or absence of nausea and vomiting  1/11/2024 2311 by Judith Munguia RN  Outcome: Progressing  Flowsheets (Taken 1/11/2024 2030)  Minimal or absence of nausea and vomiting:   Administer IV fluids as ordered to ensure adequate hydration   Administer ordered antiemetic medications as needed   Nasogastric tube to low intermittent suction as ordered   Provide nonpharmacologic comfort measures as appropriate  1/11/2024 2003 by Milady Ochoa RN  Outcome: Progressing  Goal: Maintains or returns to baseline bowel function  1/11/2024 2311 by Judith Munguia RN  Outcome: Progressing  Flowsheets (Taken 1/11/2024 2030)  Maintains or returns to baseline bowel function:   Assess bowel function   Administer IV fluids as ordered to ensure adequate hydration   Administer ordered  medications as needed   Encourage mobilization and activity  1/11/2024 2003 by Milady Ochoa RN  Outcome: Not Progressing  Goal: Maintains adequate nutritional intake  1/11/2024 2311 by Judith Munguia RN  Outcome: Progressing  Flowsheets (Taken 1/11/2024 2030)  Maintains adequate nutritional intake: Monitor intake and output, weight and lab values  1/11/2024 2003 by Milady Ochoa RN  Outcome: Not Progressing     Problem: Gastrointestinal - Adult  Goal: Maintains or returns to baseline bowel function  1/11/2024 2311 by Judith Munguia RN  Outcome: Progressing  Flowsheets (Taken 1/11/2024 2030)  Maintains or returns to baseline bowel function:   Assess bowel function   Administer IV fluids as ordered to ensure adequate hydration   Administer ordered medications as needed   Encourage mobilization and activity  1/11/2024 2003 by Milady Ochoa RN  Outcome: Not Progressing  Goal: Maintains adequate nutritional intake  1/11/2024 2311 by Judith Munguia RN  Outcome: Progressing  Flowsheets (Taken 1/11/2024 2030)  Maintains adequate nutritional intake: Monitor intake and output, weight and lab values  1/11/2024 2003 by Milady Ochoa RN  Outcome: Not Progressing

## 2024-01-12 NOTE — PROGRESS NOTES
Patient claimed that she had opened her bowels just now, advised if she goes again, to let the nurse know so we can have a look at it.

## 2024-01-12 NOTE — PROGRESS NOTES
Transitions of Care Pharmacy Service   Medication Review    The patient's list of current home medications has been reviewed.     Source(s) of information: spoke to patient, patient home medication bottle, [facility] and sure scripts     Based on information provided by the above source(s), I have updated the patient's home med list as described below.       I changed or updated the following medications on the patient's home medication list:  Discontinued omeprazole (PRILOSEC) 40 MG delayed release capsule  orphenadrine (NORFLEX) 100 MG extended release tablet  meloxicam (MOBIC) 7.5 MG tablet  gabapentin (NEURONTIN) 300 MG capsule  DULoxetine (CYMBALTA) 60 MG extended release capsule  Melatonin 5 MG TABS     Added gabapentin (NEURONTIN) 100 MG capsule  Melatonin 10 MG TABS     Adjusted   None      Other Notes Patient stated that she has gabapentin (NEURONTIN) 100 MG capsule but only takes it if she need it.            Please feel free to call me with any questions about this encounter. Thank you.    This note will be reviewed and co-signed by the Transitions of Care Pharmacist. The pharmacist will review inpatient orders and contact the physician about any discrepancies.    Sundeep Yan, pharmacy technician  Transitions of [facility] Pharmacy Service  Phone:  [phone]  Fax: [phone]      Electronically signed by Sundeep Yan on 1/12/2024 at 4:56 PM       Prior to Admission medications    Medication Sig   gabapentin (NEURONTIN) 100 MG capsule Take 1 capsule by mouth 3 times daily as needed.   venlafaxine (EFFEXOR) 75 MG tablet Take 1 tablet by mouth 2 times daily   clonazePAM (KLONOPIN) 0.5 MG tablet Take 1 tablet by mouth 3 times daily as needed for Anxiety.   estradiol-norethindrone (ACTIVELLA) 1-0.5 MG per tablet Take 1 tablet by mouth daily   lamoTRIgine (LAMICTAL) 100 MG tablet Take 1 tablet by mouth daily   Melatonin 10 MG TABS   Take 10-20 mg by mouth nightly as needed   oxybutynin  (DITROPAN-XL) 10 MG extended release tablet Take 1 tablet by mouth daily

## 2024-01-12 NOTE — PROGRESS NOTES
Patient admitted and oriented to room. Vital signs taken, admission database completed and patient instructed to use call light if needing anything.

## 2024-01-12 NOTE — PROGRESS NOTES
SPIRITUAL CARE DEPARTMENT [facility]  PROGRESS NOTE    Room # 2007/2007-02   Name: Vani Brown            Taoist: partial bowel obstruction     Reason for visit: Spiritual Care Consult    I visited the patient.    Admit Date & Time: 1/11/2024 11:00 AM    Assessment:  Vani Brown is a 48 y.o. female in the hospital because she has partial bowel obstruction. Upon entering the room, the patient is resting in bed. She is awake but anxious.      Intervention:  I introduced myself and my title as  I offered space for patient  to express feelings, needs, and concerns and provided a ministry presence. Patient sharing thoughts and feeling about dealing with depression and anxiety. She shares grief about the loss of her marriage and hopes for a new job. Patient reports not sleeping at all last night and feeling tired and anxious.  offers active listening and emotional support.  brings back a journal and prayer card.    Outcome:  Patient thankful and venting emotion.    Plan:  Chaplains will remain available to offer spiritual and emotional support as needed.    Electronically signed by Chaplain Abbie, on 1/12/2024 at 4:56 PM.  Spiritual Care Department  [facility]      01/12/24 1653   Encounter Summary   Service Provided For: Patient   Referral/Consult From: Nurse   Support System Parent   Last Encounter  01/12/24   Complexity of Encounter Moderate   Begin Time 1440   End Time  1515   Total Time Calculated 35 min   Spiritual/Emotional needs   Type Emotional Distress;Spiritual Support   Grief, Loss, and Adjustments   Type Grief and loss   Assessment/Intervention/Outcome   Assessment Anxious;Compromised coping;Fearful;Despair;Impaired resilience;Impaired social interaction;Interrupted family processes;Loneliness;Sad;Tearful;Stress overload   Intervention Active listening;Discussed belief system/Druze practices/kirti;Explored/Affirmed  feelings, thoughts, concerns;Explored Coping Skills/Resources;Discussed illness injury and it’s impact;Discussed meaning/purpose;Discussed relationship with God;Grief Care;Life review/Legacy;Nurtured Hope;Prayer (assurance of)/[place];Sustaining Presence/Ministry of presence   Outcome Comfort;Connection/Belonging;Encouraged;Engaged in conversation;Expressed feelings, needs, and concerns;Grieving;Receptive;Venting emotion

## 2024-01-12 NOTE — PROGRESS NOTES
General Surgery:  Daily Progress Note                   PATIENT NAME: Vani Brown     TODAY'S DATE: 1/12/2024, 5:51 AM  CC:  abdominal pain, constipation    SUBJECTIVE:     Pt seen and examined at bedside.      Tmax 98.6.    Pt states overall pain is controlled on morphine. She has been able to ambulate without issue. She reports no gas or bowel movement since yesterday morning around 2:30am. She had subtotal colectomy so her BMs are loose, but she reports more liquid than previously.     She takes potentially constipating medications: klonapin, lamictal, ditripan, and effexor  She states these are for depression  She also reports that she eats no fiber. She eats mostly processed foods and no fruits or vegetables.    OBJECTIVE:   VITALS:  /72   Pulse 75   Temp 98.6 °F (37 °C) (Oral)   Resp 16   Ht 1.676 m (5' 6\")   Wt 84.5 kg (186 lb 4.8 oz)   SpO2 96%   BMI 30.07 kg/m²      INTAKE/OUTPUT:      Intake/Output Summary (Last 24 hours) at 1/12/2024 0551  Last data filed at 1/11/2024 2311  Gross per 24 hour   Intake 765.83 ml   Output --   Net 765.83 ml       PHYSICAL EXAM:  General Appearance: awake, alert, oriented, in no acute distress  HEENT:  Normocephalic, atraumatic, mucus membranes moist   Heart: Heart regular rate and rhythm  Lungs: Normal expansion.  Clear to auscultation.  No rales, rhonchi, or wheezing.  Abdomen:soft, nontender to palpation, no masses or organomegaly present. Well-healed midline surgical scar   Extremities: No cyanosis, pitting edema, rashes noted.    Skin: Skin color, texture, turgor normal. No rashes or lesions.    Data:  CBC:   Lab Results   Component Value Date/Time    WBC 4.8 01/12/2024 07:16 AM    RBC 3.90 01/12/2024 07:16 AM    HGB 12.1 01/12/2024 07:16 AM    HCT 36.7 01/12/2024 07:16 AM    MCV 94.1 01/12/2024 07:16 AM    MCH 31.0 01/12/2024 07:16 AM    MCHC 33.0 01/12/2024 07:16 AM    RDW 13.0 01/12/2024 07:16 AM     01/12/2024 07:16 AM    MPV 9.1  small bowel distension, there is swirling of the mesentery and folded/tethered appearance (best demonstrated on sagittal series 601, image 87).  The distal small bowel to the level of the anastomosis contains a mild amount of gas.  Mild stool burden in the rectum.  No bowel wall thickening or inflammatory change identified. Pelvis: No acute findings. Peritoneum/Retroperitoneum: No free air or free fluid.  The aorta is normal in caliber.  The visceral branches are patent. No lymphadenopathy. Bones/Soft Tissues: No abnormality identified. *Unless otherwise specified, incidental findings do not require dedicated imaging follow-up.     Status post subtotal colectomy.  Distended small bowel involving the ileum in the left abdomen with tethered/ folded appearance concerning for least partial obstructing process related to an adhesion.  Consider follow-up with oral contrast or small-bowel follow-through series.     XR CHEST 1 VIEW    Result Date: 1/11/2024  EXAMINATION: ONE XRAY VIEW OF THE CHEST 1/11/2024 11:29 am COMPARISON: None. HISTORY: ORDERING SYSTEM PROVIDED HISTORY: eval for midsternal chest pain TECHNOLOGIST PROVIDED HISTORY: eval for midsternal chest pain Reason for Exam: Eval for midsternal chest pain. AP UPRIGHT PORTABLE FINDINGS: Cervical hardware.  Rotated to the right. The lungs are without acute focal process.  There is no effusion or pneumothorax.  Mild probable medial basilar atelectasis. The cardiomediastinal silhouette is without acute process. The osseous structures are without acute process.  Gas-filled left subdiaphragmatic splenic flexure.     No acute process.        ASSESSMENT:  Active Hospital Problems    Diagnosis Date Noted    Partial small bowel obstruction (HCC) [K56.600] 01/11/2024    Sarcoidosis [D86.9] 01/11/2024    Acquired hypothyroidism [E03.9] 01/11/2024    Anxiety [F41.9] 01/11/2024       48 y.o. female with history of GERD, IBS, sarcoid, and subtotal colectomy presenting with lower

## 2024-01-12 NOTE — PLAN OF CARE
Problem: Discharge Planning  Goal: Discharge to home or other facility with appropriate resources  Outcome: Progressing     Problem: Pain  Goal: Verbalizes/displays adequate comfort level or baseline comfort level  Outcome: Progressing     Problem: Gastrointestinal - Adult  Goal: Minimal or absence of nausea and vomiting  Outcome: Progressing  Goal: Maintains or returns to baseline bowel function  Outcome: Progressing  Goal: Maintains adequate nutritional intake  Outcome: Progressing     Problem: Safety - Adult  Goal: Free from fall injury  Outcome: Progressing     Problem: ABCDS Injury Assessment  Goal: Absence of physical injury  Outcome: Progressing

## 2024-01-12 NOTE — PLAN OF CARE
Problem: Gastrointestinal - Adult  Goal: Maintains or returns to baseline bowel function  Outcome: Not Progressing  Goal: Maintains adequate nutritional intake  Outcome: Not Progressing     Problem: Discharge Planning  Goal: Discharge to home or other facility with appropriate resources  Outcome: Progressing  Flowsheets (Taken 1/11/2024 7504)  Discharge to home or other facility with appropriate resources: Identify discharge learning needs (meds, wound care, etc)     Problem: Gastrointestinal - Adult  Goal: Minimal or absence of nausea and vomiting  Outcome: Progressing     Problem: Pain  Goal: Verbalizes/displays adequate comfort level or baseline comfort level  Outcome: Adequate for Discharge     Problem: Gastrointestinal - Adult  Goal: Maintains or returns to baseline bowel function  Outcome: Not Progressing  Goal: Maintains adequate nutritional intake  Outcome: Not Progressing

## 2024-01-12 NOTE — CARE COORDINATION
Case Management Assessment  Initial Evaluation    Date/Time of Evaluation: 1/12/2024 10:34 AM  Assessment Completed by: Brittany Gandara RN    If patient is discharged prior to next notation, then this note serves as note for discharge by case management.    Patient Name: Vani Brown                   YOB: 1975  Diagnosis: Partial small bowel obstruction (HCC) [K56.600]                   Date / Time: 1/11/2024 11:00 AM    Patient Admission Status: Inpatient   Readmission Risk (Low < 19, Mod (19-27), High > 27): Readmission Risk Score: 8.6    Current PCP: Nita Rosario  PCP verified by CM? Yes    Chart Reviewed: Yes      History Provided by: Patient  Patient Orientation: Alert and Oriented    Patient Cognition: Alert    Hospitalization in the last 30 days (Readmission):  No    If yes, Readmission Assessment in CM Navigator will be completed.    Advance Directives:      Code Status: Full Code   Patient's Primary Decision Maker is: Legal Next of Kin      Discharge Planning:    Patient lives with: Alone Type of Home: Apartment  Primary Care Giver: Self  Patient Support Systems include: Parent, Family Members   Current Financial resources: Medicaid  Current community resources: None  Current services prior to admission: None            Current DME:              Type of Home Care services:  None    ADLS  Prior functional level: Independent in ADLs/IADLs  Current functional level: Independent in ADLs/IADLs    PT AM-PAC:   /24  OT AM-PAC:   /24    Family can provide assistance at DC:    Would you like Case Management to discuss the discharge plan with any other family members/significant others, and if so, who?    Plans to Return to Present Housing: Yes  Other Identified Issues/Barriers to RETURNING to current housing: Pain  Potential Assistance needed at discharge: N/A            Potential DME:    Patient expects to discharge to: Apartment  Plan for transportation at discharge:  Self    Financial    Payor: [facility] LUZMARIA / Plan: BLUE CROSS [employer]PLETE LUZMARIA / Product Type: *No Product type* /     Does insurance require precert for SNF: Yes    Potential assistance Purchasing Medications: No  Meds-to-Beds request: No      [id number] [place] [address] [address] - P [phone] - F [phone]  [address] [address] [address]  Phone: [phone] Fax: [phone]      Notes:    Factors facilitating achievement of predicted outcomes: Motivated and Cooperative    Barriers to discharge: Pain    Additional Case Management Notes:    CM spoke with patient at bedside to discuss transitional planning. Patient lives alone in a 1 level apartment and is independent with all ADLs. Patient admitted with partial small bowel obstruction. Surgery consulted and patient NPO. Patient plans to return home indpendent at discharge.    The Plan for Transition of Care is related to the following treatment goals of Partial small bowel obstruction (HCC) [K56.600]    IF APPLICABLE: The Patient and/or patient representative Vani and her family were provided with a choice of provider and agrees with the discharge plan. Freedom of choice list with basic dialogue that supports the patient's individualized plan of care/goals and shares the quality data associated with the providers was provided to: Patient   Patient Representative Name:       The Patient and/or Patient Representative Agree with the Discharge Plan? Yes    Brittany Gandara RN  Case Management Department  Ph: [phone]

## 2024-01-13 VITALS
DIASTOLIC BLOOD PRESSURE: 70 MMHG | WEIGHT: 171.2 LBS | BODY MASS INDEX: 27.51 KG/M2 | RESPIRATION RATE: 18 BRPM | SYSTOLIC BLOOD PRESSURE: 116 MMHG | HEIGHT: 66 IN | OXYGEN SATURATION: 97 % | TEMPERATURE: 98.2 F | HEART RATE: 70 BPM

## 2024-01-13 PROCEDURE — 99231 SBSQ HOSP IP/OBS SF/LOW 25: CPT | Performed by: SURGERY

## 2024-01-13 PROCEDURE — 2580000003 HC RX 258: Performed by: INTERNAL MEDICINE

## 2024-01-13 PROCEDURE — 99238 HOSP IP/OBS DSCHRG MGMT 30/<: CPT | Performed by: INTERNAL MEDICINE

## 2024-01-13 PROCEDURE — G0378 HOSPITAL OBSERVATION PER HR: HCPCS

## 2024-01-13 PROCEDURE — 96376 TX/PRO/DX INJ SAME DRUG ADON: CPT

## 2024-01-13 PROCEDURE — 6360000002 HC RX W HCPCS: Performed by: INTERNAL MEDICINE

## 2024-01-13 PROCEDURE — 6370000000 HC RX 637 (ALT 250 FOR IP): Performed by: INTERNAL MEDICINE

## 2024-01-13 PROCEDURE — 96372 THER/PROPH/DIAG INJ SC/IM: CPT

## 2024-01-13 RX ORDER — ACETAMINOPHEN AND CODEINE PHOSPHATE 300; 30 MG/1; MG/1
1 TABLET ORAL EVERY 8 HOURS PRN
Qty: 8 TABLET | Refills: 0 | Status: SHIPPED | OUTPATIENT
Start: 2024-01-13 | End: 2024-01-13 | Stop reason: HOSPADM

## 2024-01-13 RX ORDER — HYDROCODONE BITARTRATE AND ACETAMINOPHEN 5; 325 MG/1; MG/1
1 TABLET ORAL EVERY 8 HOURS PRN
Qty: 8 TABLET | Refills: 0 | Status: SHIPPED | OUTPATIENT
Start: 2024-01-13 | End: 2024-01-16

## 2024-01-13 RX ADMIN — SODIUM CHLORIDE, PRESERVATIVE FREE 10 ML: 5 INJECTION INTRAVENOUS at 09:36

## 2024-01-13 RX ADMIN — SODIUM CHLORIDE: 9 INJECTION, SOLUTION INTRAVENOUS at 06:31

## 2024-01-13 RX ADMIN — VENLAFAXINE HYDROCHLORIDE 75 MG: 75 CAPSULE, EXTENDED RELEASE ORAL at 09:35

## 2024-01-13 RX ADMIN — MORPHINE SULFATE 4 MG: 4 INJECTION, SOLUTION INTRAMUSCULAR; INTRAVENOUS at 06:29

## 2024-01-13 RX ADMIN — MORPHINE SULFATE 4 MG: 4 INJECTION, SOLUTION INTRAMUSCULAR; INTRAVENOUS at 03:22

## 2024-01-13 RX ADMIN — LAMOTRIGINE 100 MG: 100 TABLET ORAL at 09:35

## 2024-01-13 RX ADMIN — ENOXAPARIN SODIUM 40 MG: 100 INJECTION SUBCUTANEOUS at 09:35

## 2024-01-13 RX ADMIN — OXYBUTYNIN CHLORIDE 10 MG: 10 TABLET, EXTENDED RELEASE ORAL at 09:35

## 2024-01-13 RX ADMIN — MORPHINE SULFATE 4 MG: 4 INJECTION, SOLUTION INTRAMUSCULAR; INTRAVENOUS at 09:49

## 2024-01-13 ASSESSMENT — PAIN DESCRIPTION - LOCATION
LOCATION: ABDOMEN

## 2024-01-13 ASSESSMENT — PAIN DESCRIPTION - ORIENTATION
ORIENTATION: LEFT;UPPER;LOWER
ORIENTATION: LEFT
ORIENTATION: LEFT;UPPER;LOWER

## 2024-01-13 ASSESSMENT — PAIN DESCRIPTION - FREQUENCY: FREQUENCY: INTERMITTENT

## 2024-01-13 ASSESSMENT — PAIN DESCRIPTION - DESCRIPTORS
DESCRIPTORS: ACHING;SHARP
DESCRIPTORS: CRAMPING;SHARP
DESCRIPTORS: ACHING;SHARP

## 2024-01-13 ASSESSMENT — PAIN - FUNCTIONAL ASSESSMENT: PAIN_FUNCTIONAL_ASSESSMENT: PREVENTS OR INTERFERES SOME ACTIVE ACTIVITIES AND ADLS

## 2024-01-13 ASSESSMENT — PAIN SCALES - GENERAL
PAINLEVEL_OUTOF10: 4
PAINLEVEL_OUTOF10: 9
PAINLEVEL_OUTOF10: 5
PAINLEVEL_OUTOF10: 8
PAINLEVEL_OUTOF10: 4

## 2024-01-13 ASSESSMENT — PAIN DESCRIPTION - ONSET: ONSET: ON-GOING

## 2024-01-13 ASSESSMENT — PAIN DESCRIPTION - PAIN TYPE: TYPE: ACUTE PAIN

## 2024-01-13 NOTE — PROGRESS NOTES
Pt discharged to home via private auto with belongings.  Discharge instructions given. AVS understood.  Pt denies having any further questions at this time  Personal items given to patient at discharge  Patient/family state they have everything they were admitted with.

## 2024-01-13 NOTE — PLAN OF CARE
Problem: Pain  Goal: Verbalizes/displays adequate comfort level or baseline comfort level  1/13/2024 0415 by Gabriella Simmons, RN  Outcome: Progressing     Problem: Discharge Planning  Goal: Discharge to home or other facility with appropriate resources  1/13/2024 0415 by Gabriella Simmons, RN  Outcome: Progressing

## 2024-01-13 NOTE — PROGRESS NOTES
[facility] General Surgery Progress Note            PATIENT NAME: Vani Brown     TODAY'S DATE: 2024, 8:30 AM    Chief Complaint   Patient presents with    Nausea     Pt states she has been having abd pain this AM, hx of blockage back in .     Abdominal Pain       SUBJECTIVE:    Pt seen and examined. No acute events overnight, passed flatus/stool and overall feeling significantly better. Reports being hungry. No new complaints otherwise.    OBJECTIVE:   Vitals:  /70   Pulse 70   Temp 98.2 °F (36.8 °C) (Oral)   Resp 18   Ht 1.676 m (5' 6\")   Wt 77.7 kg (171 lb 3.2 oz)   SpO2 97%   BMI 27.63 kg/m²    TEMPERATURE:  Current - Temp: 98.2 °F (36.8 °C); Max - Temp  Av.5 °F (36.9 °C)  Min: 98.2 °F (36.8 °C)  Max: 99 °F (37.2 °C)    INTAKE/OUTPUT:      Intake/Output Summary (Last 24 hours) at 2024 0830  Last data filed at 2024 0323  Gross per 24 hour   Intake 860 ml   Output 1800 ml   Net -940 ml                 General: AOx3, NAD  Lungs: Symmetrical chest rise bilaterally, no audible wheezes or rhonchi  Heart: S1S2  Abdomen: Soft, ND, Nontender.  Extremity: moves all extremities x4, No edema      Data Review:  CBC:   Recent Labs     24  1140 24  0716   WBC 6.6 4.8   HGB 14.3 12.1    244     BMP:    Recent Labs     24  1140 24  0716    140   K 4.2 4.0    107   CO2 23 23   BUN 9 12   CREATININE 1.0* 0.9   GLUCOSE 93 79     Hepatic:   Recent Labs     24  1140   AST 19   ALT 20   ALKPHOS 102   BILITOT 0.5     Coagulation:   Recent Labs     24  1140   PROT 8.0             ASSESSMENT     Active Hospital Problems    Diagnosis Date Noted    Partial small bowel obstruction (HCC) [K56.600] 2024    Sarcoidosis [D86.9] 2024    Acquired hypothyroidism [E03.9] 2024    Anxiety [F41.9] 2024          PLAN    Low fiber diet  Ok to DC from GS standpoint when medically stable, no plans for surgical intervention at this time.

## 2024-01-13 NOTE — DISCHARGE SUMMARY
[facility]  Office: [phone]  Darius Fair DO, Braden Miranda DO, Ad Elizalde DO, Dustin Cortez DO, Kim Jennings MD, Hien Torres MD, Andre Gee MD, Dianne Littlejohn MD,  Marco Hong MD, Lorenzo Dawkins MD, Adi Charles MD,  Wyatt Enrique DO, Tracey Christine MD, Christian Dewitt MD, Brooks Fair DO, Ginger Tapia MD,  Lambert Menchaca DO, Natalee Artis MD, Terese Magana MD, Randa Giron MD, Gigi Chapman MD,  Chester Spencer MD, Mattie Santos MD, Giovanni Lobato MD, Yael Maciel MD, Arvind Dias MD, Valeriano Ballard MD, Parag Lan DO, Bucky Redi DO, Daphne Joy MD,  Sagar Whittaker MD, Shirley Waterhouse, CNP,  Mine Tobin CNP, Malcolm Santana, CNP,  Елена Almaraz, SAMM, Angelika Dial, CNP, Arehta Hernandez, CNP, Jenny Quintanilla CNP, Bianca London, CNP, Anum Breaux, CNP, Dianne Adams, PA-C, Nuzhat Vaughn PA-C, Nevaeh Dasilva, CNP, Gail Singleton, CNS, Carline Stacy, CNP, Oanh Yoder, CNP, Tracy Schwab, CNP         [facility]   IN-PATIENT SERVICE   [facility]    Discharge Summary     Patient ID: Vani Brown  :  1975   MRN: [id number]     ACCOUNT:  [id number]   Patient's PCP: Nita Rosario  Admit Date: 2024   Discharge Date: 2024     Length of Stay: 2  Code Status:  Full Code  Admitting Physician: Dustin Cortez DO  Discharge Physician: Dustin Cortez DO     Active Discharge Diagnoses:     Hospital Problem Lists:  Principal Problem:    Partial small bowel obstruction (HCC)  Active Problems:    Sarcoidosis    Acquired hypothyroidism    Anxiety  Resolved Problems:    * No resolved hospital problems. *      Admission Condition:  fair     Discharged Condition: stable    Hospital Stay:     Hospital Course:  Vani Brown is a 48 y.o. female who was admitted for the management of  Partial small bowel obstruction (HCC) , presented to ER with Nausea (Pt states she has been having abd pain this AM, hx  patient's care.

## 2024-01-13 NOTE — PROGRESS NOTES
[facility]  Office: [phone]  Darius Fair DO, Braden Miranda DO, Ad Elizalde DO, Dustin Cortez DO, Kim Jennings MD, Hien Torres MD, Andre Gee MD, Dianne Littlejohn MD,  Marco Hong MD, Lorenzo Dawkins MD, Adi Charles MD,  Wyatt Enrique DO, Tracey Christine MD, Christian Dewitt MD, Brooks Fair DO, Ginger Tapia MD,  Lambert Menchaca DO, Natalee Artis MD, Terese Magana MD, Randa Giron MD, Gigi Chapman MD,  Chester Spencer MD, Mattie Santos MD, Giovanni Lobato MD, Yael Maciel MD, Arvind Dias MD, Valeriano Ballard MD, Parag Lan DO, Bucky Reid DO, Daphne Joy MD,  Sagar Whittaker MD, Shirley Waterhouse, CNP,  Mine Tobin CNP, Malcolm Santana, CNP,  Елена Almaraz, SAMM, Angelika Dial, CNP, Aretha Hernandez, CNP, Jenny Quintanilla CNP, Bianca London CNP, Anum Breaux, CNP, Dianne Adams, PA-C, Nuzhat Vaughn, PA-C, Nevaeh Dasilva, CNP, Gail Singleton, CNS, Carline Stacy, CNP, Oanh Ydoer, CNP, Tracy Schwab, CNP         [facility]   IN-PATIENT SERVICE   [facility]    Progress Note    1/13/2024    10:55 AM    Name:   Vani Brown  MRN:     [id number]     Acct:      [id number]   Room:   2007/2007-02  IP Day:  2  Admit Date:  1/11/2024 11:00 AM    PCP:   Nita Rosario  Code Status:  Full Code    Subjective:     C/C:   Chief Complaint   Patient presents with    Nausea     Pt states she has been having abd pain this AM, hx of blockage back in June.     Abdominal Pain     Interval History Status: improved.     States the pain is better each day but still hurts periumbilical    Denies cp/sob/n/v    Tolerating diet thus far-to have reg food for lunch    Brief History:     Vani Brown is a 48 y.o. Non- / non  female who presents with Nausea (Pt states she has been having abd pain this AM, hx of blockage back in June. ) and Abdominal Pain   and is admitted to the hospital for the management of Partial small bowel  79     ABG:  Lab Results   Component Value Date/Time    FIO2 INFORMATION NOT PROVIDED 08/17/2023 06:14 AM     No results found for: \"SPECIAL\"  No results found for: \"CULTURE\"    Radiology:  CT ABDOMEN PELVIS W IV CONTRAST Additional Contrast? None    Result Date: 1/11/2024  Status post subtotal colectomy.  Distended small bowel involving the ileum in the left abdomen with tethered/ folded appearance concerning for least partial obstructing process related to an adhesion.  Consider follow-up with oral contrast or small-bowel follow-through series.     XR CHEST 1 VIEW    Result Date: 1/11/2024  No acute process.       Physical Examination:        General appearance:  alert, cooperative and no distress  Mental Status:  oriented to person, place and time and normal affect  Lungs:  clear to auscultation bilaterally, normal effort  Heart:  regular rate and rhythm, no murmur  Abdomen:  soft, mildly periumbilical tender, nondistended, normal bowel sounds, no masses, hepatomegaly, splenomegaly  Extremities:  no edema, redness, tenderness in the calves  Skin:  no gross lesions, rashes, induration    Assessment:        Hospital Problems             Last Modified POA    * (Principal) Partial small bowel obstruction (HCC) 1/11/2024 Yes    Sarcoidosis 1/11/2024 Yes    Acquired hypothyroidism 1/11/2024 Yes    Anxiety 1/11/2024 Yes       Plan:        Dc iv pain meds  Dc home  Diet advanced per surgery    Dustin Cortez,   1/13/2024  10:55 AM

## 2024-01-13 NOTE — PLAN OF CARE
Problem: Discharge Planning  Goal: Discharge to home or other facility with appropriate resources  1/13/2024 1236 by Jordana Singh RN  Outcome: Adequate for Discharge  Flowsheets (Taken 1/13/2024 0936)  Discharge to home or other facility with appropriate resources:   Identify barriers to discharge with patient and caregiver   Arrange for needed discharge resources and transportation as appropriate   Identify discharge learning needs (meds, wound care, etc)   Refer to discharge planning if patient needs post-hospital services based on physician order or complex needs related to functional status, cognitive ability or social support system     Problem: Pain  Goal: Verbalizes/displays adequate comfort level or baseline comfort level  1/13/2024 1236 by Jordana Singh RN  Outcome: Adequate for Discharge  Flowsheets (Taken 1/13/2024 1236)  Verbalizes/displays adequate comfort level or baseline comfort level:   Encourage patient to monitor pain and request assistance   Assess pain using appropriate pain scale   Administer analgesics based on type and severity of pain and evaluate response   Implement non-pharmacological measures as appropriate and evaluate response   Notify Licensed Independent Practitioner if interventions unsuccessful or patient reports new pain  Note: Pain level assessment complete.   Patient educated on pain scale and control interventions  PRN pain medication given per patient request-Morphine  Patient instructed to call out with new onset of pain or unrelieved pain     Problem: Gastrointestinal - Adult  Goal: Minimal or absence of nausea and vomiting  Outcome: Adequate for Discharge  Flowsheets (Taken 1/13/2024 0936)  Minimal or absence of nausea and vomiting:   Administer IV fluids as ordered to ensure adequate hydration   Administer ordered antiemetic medications as needed   Provide nonpharmacologic comfort measures as appropriate  Goal: Maintains or returns to baseline bowel

## 2024-10-23 NOTE — ED NOTES
REPORT TO FAISAL WEBSTER   We discussed our Fibrocystic Mastopathy Protocol in detail. She should take Vitamin E 800 IU everyday x 3 months or until non-tender then can stop Vitamin E vs. continue daily at 400 IU.  The use of ice packs or warms soaks to tender area of the breast may also be of some benefit.  If warm soaks help her tenderness - She can use Aspercreme (unless allergic to Aspirin) on the affected area.  Ibuprofen (if no contraindications) at 800 mg three times per day for 5 days can also relieve many symptoms associated with swollen or inflamed tissue.  She can repeat Ibuprofen for 5 days, but then should be off for 5 days as it may cause gastric upset.  It is a good idea to wear a tight bra during the day and night to minimize movement of the tender area (Sports Bras work well).  Evening Primrose Oil can be bought over the counter and used at a dose of 3000 mg per day to help with any breast pain/tenderness not improved by implementing the above measures.